# Patient Record
Sex: FEMALE | Race: WHITE | NOT HISPANIC OR LATINO | Employment: FULL TIME | URBAN - METROPOLITAN AREA
[De-identification: names, ages, dates, MRNs, and addresses within clinical notes are randomized per-mention and may not be internally consistent; named-entity substitution may affect disease eponyms.]

---

## 2017-01-03 ENCOUNTER — OFFICE VISIT (OUTPATIENT)
Dept: URGENT CARE | Facility: CLINIC | Age: 42
End: 2017-01-03
Payer: COMMERCIAL

## 2017-01-03 VITALS
WEIGHT: 150 LBS | OXYGEN SATURATION: 98 % | RESPIRATION RATE: 18 BRPM | HEIGHT: 69 IN | HEART RATE: 104 BPM | TEMPERATURE: 97.9 F | BODY MASS INDEX: 22.22 KG/M2 | DIASTOLIC BLOOD PRESSURE: 86 MMHG | SYSTOLIC BLOOD PRESSURE: 124 MMHG

## 2017-01-03 DIAGNOSIS — J01.10 ACUTE FRONTAL SINUSITIS, RECURRENCE NOT SPECIFIED: ICD-10-CM

## 2017-01-03 PROCEDURE — 99214 OFFICE O/P EST MOD 30 MIN: CPT | Performed by: PHYSICIAN ASSISTANT

## 2017-01-03 RX ORDER — AMOXICILLIN AND CLAVULANATE POTASSIUM 875; 125 MG/1; MG/1
1 TABLET, FILM COATED ORAL 2 TIMES DAILY
Qty: 20 TAB | Refills: 0 | Status: SHIPPED | OUTPATIENT
Start: 2017-01-03 | End: 2020-07-15

## 2017-01-03 ASSESSMENT — ENCOUNTER SYMPTOMS
NAUSEA: 0
ABDOMINAL PAIN: 0
SENSORY CHANGE: 0
SHORTNESS OF BREATH: 0
DIZZINESS: 0
WHEEZING: 0
HEADACHES: 1
DIARRHEA: 0
FEVER: 0
SINUS PRESSURE: 1
CHILLS: 0
SPUTUM PRODUCTION: 1
SORE THROAT: 0
COUGH: 1
MYALGIAS: 0
VOMITING: 0
PALPITATIONS: 0
TINGLING: 0
FOCAL WEAKNESS: 0

## 2017-01-03 NOTE — MR AVS SNAPSHOT
"        Lizzy De   1/3/2017 9:15 AM   Office Visit   MRN: [id number]    Department:  [facility]   Dept Phone:  [phone]    Description:  Female : 1975   Provider:  Torie Horn PA-C           Reason for Visit     Sinus Problem facial pressure, cough, yellowish green phlegm, since thursday       Allergies as of 1/3/2017     Allergen Noted Reactions    Iodine 2008         You were diagnosed with     Acute frontal sinusitis, recurrence not specified   [[id number]]         Vital Signs     Blood Pressure Pulse Temperature Respirations Height Weight    124/86 mmHg 104 36.6 °C (97.9 °F) 18 1.753 m (5' 9\") 68.04 kg (150 lb)    Body Mass Index Oxygen Saturation Breastfeeding?             22.14 kg/m2 98% No         Basic Information     Date Of Birth Sex Race Ethnicity Preferred Language    1975 Female White Non- English      Problem List              ICD-10-CM Priority Class Noted - Resolved    Migraine G43.909   2015 - Present      Health Maintenance        Date Due Completion Dates    IMM DTaP/Tdap/Td Vaccine (1 - Tdap) 1995 ---    PAP SMEAR 1997 ---    MAMMOGRAM 2016 ---    IMM INFLUENZA (1) 2016 ---            Current Immunizations     No immunizations on file.      Below and/or attached are the medications your provider expects you to take. Review all of your home medications and newly ordered medications with your provider and/or pharmacist. Follow medication instructions as directed by your provider and/or pharmacist. Please keep your medication list with you and share with your provider. Update the information when medications are discontinued, doses are changed, or new medications (including over-the-counter products) are added; and carry medication information at all times in the event of emergency situations     Allergies:  IODINE - (reactions not documented)               Medications  Valid as of: 2017 - 10:15 AM    Generic Name Brand " Name Tablet Size Instructions for use    Amoxicillin-Pot Clavulanate (Tab) AUGMENTIN 875-125 MG Take 1 Tab by mouth 2 times a day.        Aspirin-Acetaminophen-Caffeine (Tab) EXCEDRIN 250-250-65 MG Take 1 Tab by mouth every 6 hours as needed for Headache.        HydrOXYzine HCl (Tab) ATARAX 25 MG Take 1 Tab by mouth 3 times a day as needed for Itching.        MethylPREDNISolone (Tab) MEDROL DOSPACK 4 MG Take as directed.        Norgestim-Eth Estrad Triphasic   Take  by mouth. PT doesn't know exact strength        Triamcinolone Acetonide (Cream) KENALOG 0.1 % Apply to rash 2-3 times daily until resolved.        .                 Medicines prescribed today were sent to:     [facility] [address] [place] [address] [facility]    [address] [facility] [address]    Phone: [phone] Fax: [phone]    Open 24 Hours?: No      Medication refill instructions:       If your prescription bottle indicates you have medication refills left, it is not necessary to call your provider’s office. Please contact your pharmacy and they will refill your medication.    If your prescription bottle indicates you do not have any refills left, you may request refills at any time through one of the following ways: The online [employer] system (except Urgent Care), by calling your provider’s office, or by asking your pharmacy to contact your provider’s office with a refill request. Medication refills are processed only during regular business hours and may not be available until the next business day. Your provider may request additional information or to have a follow-up visit with you prior to refilling your medication.   *Please Note: Medication refills are assigned a new Rx number when refilled electronically. Your pharmacy may indicate that no refills were authorized even though a new prescription for the same medication is available at the pharmacy. Please request the medicine by  name with the pharmacy before contacting your provider for a refill.           [employer] Access Code: [email or web address]  Expires: 2/2/2017 10:15 AM    [employer]  A secure, online tool to manage your health information     [employer]’s [employer]® is a secure, online tool that connects you to your personalized health information from the privacy of your home -- day or night - making it very easy for you to manage your healthcare. Once the activation process is completed, you can even access your medical information using the [employer] jose, which is available for free in the Apple Jose store or Google Play store.     [employer] provides the following levels of access (as shown below):   My Chart Features   [facility] Primary [facility] Doctor [facility]  Specialists [facility]  Urgent  Care [facility]  Primary Care  Doctor   Email your healthcare team securely and privately 24/7 X X X    Manage appointments: schedule your next appointment; view details of past/upcoming appointments X      Request prescription refills. X      View recent personal medical records, including lab and immunizations X X X X   View health record, including health history, allergies, medications X X X X   Read reports about your outpatient visits, procedures, consult and ER notes X X X X   See your discharge summary, which is a recap of your hospital and/or ER visit that includes your diagnosis, lab results, and care plan. X X       How to register for [employer]:  1. Go to  [email or web address].  2. Click on the Sign Up Now box, which takes you to the New Member Sign Up page. You will need to provide the following information:  a. Enter your [employer] Access Code exactly as it appears at the top of this page. (You will not need to use this code after you’ve completed the sign-up process. If you do not sign up before the expiration date, you must request a new code.)   b. Enter your date of birth.   c. Enter your home email address.   d. Click Submit, and follow the  next screen’s instructions.  3. Create a [employer] ID. This will be your [employer] login ID and cannot be changed, so think of one that is secure and easy to remember.  4. Create a [employer] password. You can change your password at any time.  5. Enter your Password Reset Question and Answer. This can be used at a later time if you forget your password.   6. Enter your e-mail address. This allows you to receive e-mail notifications when new information is available in [employer].  7. Click Sign Up. You can now view your health information.    For assistance activating your [employer] account, call [phone]

## 2017-01-03 NOTE — PROGRESS NOTES
"Subjective:      Lizzy Kc is a 41 y.o. female who presents with Sinus Problem            Sinus Problem  This is a new problem. Episode onset: 5-6 days  The problem has been gradually worsening since onset. Maximum temperature: subjective  The pain is moderate. Associated symptoms include congestion (with yellow green sputum), coughing, headaches and sinus pressure (right sided). Pertinent negatives include no chills, ear pain, shortness of breath or sore throat. Treatments tried: fluids, Mucinex, OTC cold  The treatment provided mild relief.     No past medical history on file.  No past surgical history on file.    No family history on file.    Allergies   Allergen Reactions   • Iodine        Medications, Allergies, and current problem list reviewed today in Epic      Review of Systems   Constitutional: Positive for malaise/fatigue. Negative for fever and chills.   HENT: Positive for congestion (with yellow green sputum) and sinus pressure (right sided). Negative for ear discharge, ear pain and sore throat.         Sinus pressure and facial pain bilaterally    Respiratory: Positive for cough and sputum production. Negative for shortness of breath and wheezing.    Cardiovascular: Negative for chest pain, palpitations and leg swelling.   Gastrointestinal: Negative for nausea, vomiting, abdominal pain and diarrhea.   Musculoskeletal: Negative for myalgias.   Neurological: Positive for headaches. Negative for dizziness, tingling, sensory change and focal weakness.     All other systems reviewed and are negative.        Objective:     /86 mmHg  Pulse 104  Temp(Src) 36.6 °C (97.9 °F)  Resp 18  Ht 1.753 m (5' 9\")  Wt 68.04 kg (150 lb)  BMI 22.14 kg/m2  SpO2 98%  Breastfeeding? No     Physical Exam   Constitutional: She is oriented to person, place, and time. She appears well-developed and well-nourished. No distress.   HENT:   Head: Normocephalic and atraumatic.   Right Ear: Tympanic membrane, external " ear and ear canal normal.   Left Ear: Tympanic membrane, external ear and ear canal normal.   Nose: Mucosal edema and rhinorrhea present. Right sinus exhibits maxillary sinus tenderness. Right sinus exhibits no frontal sinus tenderness. Left sinus exhibits maxillary sinus tenderness. Left sinus exhibits no frontal sinus tenderness.   Mouth/Throat: Uvula is midline and oropharynx is clear and moist. No oropharyngeal exudate or posterior oropharyngeal erythema.   Neck: Neck supple.   Cardiovascular: Normal rate, regular rhythm and normal heart sounds.  Exam reveals no gallop and no friction rub.    No murmur heard.  Pulmonary/Chest: Effort normal and breath sounds normal. No respiratory distress. She has no decreased breath sounds. She has no wheezes. She has no rhonchi. She has no rales.   Lymphadenopathy:     She has no cervical adenopathy.   Neurological: She is alert and oriented to person, place, and time. No cranial nerve deficit.   Skin: Skin is warm and dry. No rash noted.   Psychiatric: She has a normal mood and affect. Her behavior is normal. Judgment and thought content normal.               Assessment/Plan:     1. Acute frontal sinusitis, recurrence not specified  amoxicillin-clavulanate (AUGMENTIN) 875-125 MG Tab         Current outpatient prescriptions:   •  amoxicillin-clavulanate (AUGMENTIN) 875-125 MG Tab, Take 1 Tab by mouth 2 times a day., Disp: 20 Tab, Rfl: 0    - encouraged fluids, rest, humidification, nasal decongestants, and FLonase.     Differential diagnoses, Supportive care, and indications for immediate follow-up discussed with patient.   Instructed to return to clinic or nearest emergency department for any change in condition, further concerns, or worsening of symptoms.    The patient demonstrated a good understanding and agreed with the treatment plan.    Torie Horn PA-C

## 2017-05-12 ENCOUNTER — RX ONLY (OUTPATIENT)
Age: 42
Setting detail: RX ONLY
End: 2017-05-12

## 2017-08-01 ENCOUNTER — HOSPITAL ENCOUNTER (OUTPATIENT)
Dept: LAB | Facility: MEDICAL CENTER | Age: 42
End: 2017-08-01
Attending: PHYSICIAN ASSISTANT
Payer: COMMERCIAL

## 2017-08-01 PROCEDURE — 87624 HPV HI-RISK TYP POOLED RSLT: CPT

## 2017-08-01 PROCEDURE — 88175 CYTOPATH C/V AUTO FLUID REDO: CPT

## 2017-08-03 LAB
CYTOLOGY REG CYTOL: NORMAL
HPV HR 12 DNA CVX QL NAA+PROBE: NEGATIVE
HPV16 DNA SPEC QL NAA+PROBE: NEGATIVE
HPV18 DNA SPEC QL NAA+PROBE: NEGATIVE
SPECIMEN SOURCE: NORMAL

## 2017-09-25 ENCOUNTER — OFFICE VISIT (OUTPATIENT)
Dept: URGENT CARE | Facility: CLINIC | Age: 42
End: 2017-09-25
Payer: COMMERCIAL

## 2017-09-25 VITALS
RESPIRATION RATE: 16 BRPM | HEIGHT: 69 IN | TEMPERATURE: 98.3 F | BODY MASS INDEX: 22.96 KG/M2 | HEART RATE: 74 BPM | SYSTOLIC BLOOD PRESSURE: 130 MMHG | WEIGHT: 155 LBS | OXYGEN SATURATION: 100 % | DIASTOLIC BLOOD PRESSURE: 84 MMHG

## 2017-09-25 DIAGNOSIS — L30.9 ECZEMA, UNSPECIFIED TYPE: ICD-10-CM

## 2017-09-25 PROCEDURE — 99214 OFFICE O/P EST MOD 30 MIN: CPT | Mod: 25 | Performed by: PHYSICIAN ASSISTANT

## 2017-09-25 PROCEDURE — 99999 PR NO CHARGE: CPT | Performed by: PHYSICIAN ASSISTANT

## 2017-09-25 RX ORDER — TRIAMCINOLONE ACETONIDE 40 MG/ML
40 INJECTION, SUSPENSION INTRA-ARTICULAR; INTRAMUSCULAR ONCE
Status: COMPLETED | OUTPATIENT
Start: 2017-09-25 | End: 2017-09-25

## 2017-09-25 RX ADMIN — TRIAMCINOLONE ACETONIDE 40 MG: 40 INJECTION, SUSPENSION INTRA-ARTICULAR; INTRAMUSCULAR at 17:37

## 2017-09-25 ASSESSMENT — ENCOUNTER SYMPTOMS
CHILLS: 0
SPUTUM PRODUCTION: 0
SHORTNESS OF BREATH: 0
TINGLING: 0
DIZZINESS: 0
FEVER: 0
ABDOMINAL PAIN: 0
DIARRHEA: 0
WHEEZING: 0
MYALGIAS: 0
FATIGUE: 0
COUGH: 0
SORE THROAT: 0
NAUSEA: 0
RHINORRHEA: 0
ANOREXIA: 0
HEADACHES: 0
PALPITATIONS: 0
VOMITING: 0

## 2017-09-26 NOTE — PROGRESS NOTES
"Subjective:      Lizzy Kc is a 42 y.o. female who presents with Eczema (break out, possible kenalog shot)            Rash   This is a recurrent problem. Episode onset: new flare about 1 week ago  The problem has been gradually worsening since onset. The rash is diffuse (arms, legs, face, ). The rash is characterized by redness and itchiness. She was exposed to nothing. Pertinent negatives include no anorexia, congestion, cough, diarrhea, facial edema, fatigue, fever, rhinorrhea, shortness of breath, sore throat or vomiting. Past treatments include antihistamine (steroid topical creams, oils, moisturizer). The treatment provided no relief. Her past medical history is significant for eczema.     Patient has history of recurrent eczema. Kenalog injections have helped in the past. She last received an injection about 6 months ago. She plans on establishing with a new PCP and seeing a dermatologist.    No past medical history on file.  No past surgical history on file.    No family history on file.    Allergies   Allergen Reactions   • Iodine        Medications, Allergies, and current problem list reviewed today in Epic      Review of Systems   Constitutional: Negative for chills, fatigue, fever and malaise/fatigue.   HENT: Negative for congestion, ear discharge, ear pain, rhinorrhea and sore throat.    Respiratory: Negative for cough, sputum production, shortness of breath and wheezing.    Cardiovascular: Negative for chest pain, palpitations and leg swelling.   Gastrointestinal: Negative for abdominal pain, anorexia, diarrhea, nausea and vomiting.   Musculoskeletal: Negative for myalgias.   Skin: Positive for itching and rash.   Neurological: Negative for dizziness, tingling and headaches.     All other systems reviewed and are negative.          Objective:     /84   Pulse 74   Temp 36.8 °C (98.3 °F)   Resp 16   Ht 1.753 m (5' 9\")   Wt 70.3 kg (155 lb)   SpO2 100%   BMI 22.89 kg/m²      Physical Exam "   Constitutional: She is oriented to person, place, and time. She appears well-developed and well-nourished. No distress.   HENT:   Head: Normocephalic and atraumatic.   Eyes: Conjunctivae are normal.   Cardiovascular: Normal rate, regular rhythm and normal heart sounds.    Pulmonary/Chest: Effort normal. No respiratory distress. She has no wheezes. She has no rales.   Neurological: She is alert and oriented to person, place, and time. No cranial nerve deficit.   Skin: Rash noted. Rash is maculopapular (noted on legs and arms bilaterally. Some patches on face c/w atopic dermatitis).   Psychiatric: She has a normal mood and affect. Her behavior is normal. Judgment and thought content normal.               Assessment/Plan:     1. Eczema, unspecified type    - triamcinolone acetonide (KENALOG-40) injection 40 mg; 1 mL by Intramuscular route Once.    Side effects and benefits of medication reviewed.      Differential diagnoses, Supportive care, and indications for immediate follow-up discussed with patient.   Instructed to return to clinic or nearest emergency department for any change in condition, further concerns, or worsening of symptoms.      The patient demonstrated a good understanding and agreed with the treatment plan.    Torie Horn P.A.-C.

## 2017-11-08 ENCOUNTER — HOSPITAL ENCOUNTER (OUTPATIENT)
Dept: RADIOLOGY | Facility: MEDICAL CENTER | Age: 42
End: 2017-11-08
Attending: PHYSICIAN ASSISTANT
Payer: COMMERCIAL

## 2017-11-08 DIAGNOSIS — N64.4 MASTODYNIA: ICD-10-CM

## 2017-11-08 PROCEDURE — 76642 ULTRASOUND BREAST LIMITED: CPT | Mod: RT

## 2017-11-08 PROCEDURE — G0279 TOMOSYNTHESIS, MAMMO: HCPCS

## 2017-12-29 ENCOUNTER — OFFICE VISIT (OUTPATIENT)
Dept: URGENT CARE | Facility: CLINIC | Age: 42
End: 2017-12-29
Payer: COMMERCIAL

## 2017-12-29 VITALS
DIASTOLIC BLOOD PRESSURE: 82 MMHG | HEIGHT: 69 IN | HEART RATE: 82 BPM | TEMPERATURE: 99.1 F | SYSTOLIC BLOOD PRESSURE: 120 MMHG | WEIGHT: 159.8 LBS | OXYGEN SATURATION: 96 % | BODY MASS INDEX: 23.67 KG/M2 | RESPIRATION RATE: 18 BRPM

## 2017-12-29 DIAGNOSIS — L30.8 OTHER ECZEMA: ICD-10-CM

## 2017-12-29 PROCEDURE — 99214 OFFICE O/P EST MOD 30 MIN: CPT | Performed by: NURSE PRACTITIONER

## 2017-12-29 RX ORDER — HYDROXYZINE HYDROCHLORIDE 25 MG/1
25 TABLET, FILM COATED ORAL 3 TIMES DAILY PRN
Qty: 30 TAB | Refills: 0 | Status: SHIPPED | OUTPATIENT
Start: 2017-12-29 | End: 2020-07-15

## 2017-12-29 RX ORDER — PREDNISONE 20 MG/1
TABLET ORAL
Qty: 22 TAB | Refills: 1 | Status: SHIPPED | OUTPATIENT
Start: 2017-12-29 | End: 2020-07-15

## 2017-12-29 ASSESSMENT — ENCOUNTER SYMPTOMS
CHILLS: 0
FEVER: 0

## 2017-12-29 NOTE — PROGRESS NOTES
"Subjective:      Lizzy Kc is a 42 y.o. female who presents with Eczema (x 1 week pt complains of pain, itchy)    PMH: history of eczema    Social hx: non-smoker    Family hx: not pertinent to today's visit    Allergies; Iodine            Other   This is a recurrent problem. The current episode started in the past 7 days. The problem has been unchanged. Associated symptoms include a rash. Pertinent negatives include no chills or fever. Nothing aggravates the symptoms. She has tried nothing for the symptoms. The treatment provided no relief.       Review of Systems   Constitutional: Negative for chills, fever and malaise/fatigue.   HENT: Negative.    Skin: Positive for itching and rash.   All other systems reviewed and are negative.         Objective:     /82   Pulse 82   Temp 37.3 °C (99.1 °F)   Resp 18   Ht 1.753 m (5' 9\")   Wt 72.5 kg (159 lb 12.8 oz)   SpO2 96%   BMI 23.60 kg/m²      Physical Exam   Neck: Normal range of motion. Neck supple.   Cardiovascular: Normal rate and regular rhythm.    Pulmonary/Chest: Effort normal and breath sounds normal.   Skin: Capillary refill takes less than 2 seconds. Rash noted.   There is a patchy scaly rash to the upper extremities, cheeks and forehead.   Psychiatric: She has a normal mood and affect. Her behavior is normal. Judgment and thought content normal.   Vitals reviewed.              Assessment/Plan:   Exacerbation of eczema    -prednisone taper   -atarax PRN itching   -keep follow up with PCP   -follow up ifs ymptoms persist or worsen   There are no diagnoses linked to this encounter.      "

## 2018-05-20 ENCOUNTER — OFFICE VISIT (OUTPATIENT)
Dept: URGENT CARE | Facility: CLINIC | Age: 43
End: 2018-05-20
Payer: COMMERCIAL

## 2018-05-20 VITALS
WEIGHT: 161 LBS | HEIGHT: 69 IN | TEMPERATURE: 98 F | SYSTOLIC BLOOD PRESSURE: 132 MMHG | OXYGEN SATURATION: 98 % | BODY MASS INDEX: 23.85 KG/M2 | DIASTOLIC BLOOD PRESSURE: 82 MMHG | HEART RATE: 71 BPM | RESPIRATION RATE: 14 BRPM

## 2018-05-20 DIAGNOSIS — L01.1 IMPETIGINIZED ATOPIC DERMATITIS: ICD-10-CM

## 2018-05-20 PROCEDURE — 99213 OFFICE O/P EST LOW 20 MIN: CPT | Performed by: EMERGENCY MEDICINE

## 2018-05-20 RX ORDER — MUPIROCIN CALCIUM 20 MG/G
1 CREAM TOPICAL 3 TIMES DAILY
Qty: 1 TUBE | Refills: 0 | Status: SHIPPED | OUTPATIENT
Start: 2018-05-20 | End: 2020-07-15

## 2018-05-20 ASSESSMENT — PATIENT HEALTH QUESTIONNAIRE - PHQ9: CLINICAL INTERPRETATION OF PHQ2 SCORE: 0

## 2018-05-20 ASSESSMENT — ENCOUNTER SYMPTOMS
SENSORY CHANGE: 0
TINGLING: 0
FEVER: 0
MYALGIAS: 0

## 2018-05-20 NOTE — PATIENT INSTRUCTIONS
You may use over-the-counter hydrocortisone as needed for skin symptom relief.  You should contact a primary care provider for follow-up as soon as available.  Referral provided.

## 2018-05-20 NOTE — PROGRESS NOTES
Subjective:      Lizzy Kc is a 43 y.o. female who presents with Rash (facial rash)            Rash   This is a new problem. Episode onset: over one week. The problem has been gradually worsening since onset. The affected locations include the face. The rash is characterized by redness and burning. It is unknown if there was an exposure to a precipitant. Pertinent negatives include no congestion or fever. Past treatments include moisturizer and topical steroids. The treatment provided no relief. Her past medical history is significant for eczema.       Review of Systems   Constitutional: Negative for fever.   HENT: Negative for congestion.    Musculoskeletal: Negative for myalgias.   Skin: Positive for rash.   Neurological: Negative for tingling and sensory change.   Endo/Heme/Allergies: Positive for environmental allergies.     PMH:  has no past medical history on file.  MEDS:   Current Outpatient Prescriptions:   •  mupirocin calcium (BACTROBAN) 2 % Cream, Apply 1 Application to affected area(s) 3 times a day., Disp: 1 Tube, Rfl: 0  •  triamcinolone acetonide (KENALOG) 0.1 % Cream, Apply to rash 2-3 times daily until resolved., Disp: 1 Tube, Rfl: 1  •  predniSONE (DELTASONE) 20 MG Tab, Take 1 tablet 3 times daily for 3 days, then take 1 tablet 2 times daily for 3 days, then take 1 tablet daily for 3 days, then take 1/2 tablet daily for 3 days., Disp: 22 Tab, Rfl: 1  •  hydrOXYzine HCl (ATARAX) 25 MG Tab, Take 1 Tab by mouth 3 times a day as needed for Itching., Disp: 30 Tab, Rfl: 0  •  amoxicillin-clavulanate (AUGMENTIN) 875-125 MG Tab, Take 1 Tab by mouth 2 times a day., Disp: 20 Tab, Rfl: 0  •  methylPREDNISolone (MEDROL DOSPACK) 4 MG Tab, Take as directed., Disp: 21 Tab, Rfl: 0  •  hydrOXYzine (ATARAX) 25 MG Tab, Take 1 Tab by mouth 3 times a day as needed for Itching., Disp: 30 Tab, Rfl: 1  •  Norgestim-Eth Estrad Triphasic (TRI-SPRINTEC PO), Take  by mouth. PT doesn't know exact strength, Disp: , Rfl:   •   "asa/apap/caffeine (EXCEDRIN) 250-250-65 MG Tab, Take 1 Tab by mouth every 6 hours as needed for Headache., Disp: , Rfl:   ALLERGIES:   Allergies   Allergen Reactions   • Iodine      SURGHX: History reviewed. No pertinent surgical history.  SOCHX:  reports that she has never smoked. She has never used smokeless tobacco. She reports that she drinks alcohol. She reports that she does not use drugs.  FH: family history is not on file.       Objective:     /82   Pulse 71   Temp 36.7 °C (98 °F)   Resp 14   Ht 1.753 m (5' 9\")   Wt 73 kg (161 lb)   SpO2 98%   BMI 23.78 kg/m²      Physical Exam   Constitutional: She is oriented to person, place, and time. Vital signs are normal. She appears well-developed and well-nourished. She is cooperative. She does not have a sickly appearance. No distress.   HENT:   Head: Normocephalic.   Nose: No mucosal edema or rhinorrhea.   Mouth/Throat: Oropharynx is clear and moist and mucous membranes are normal.   Eyes: Conjunctivae and lids are normal.   Lymphadenopathy:        Head (right side): No submandibular adenopathy present.        Head (left side): No submandibular adenopathy present.   Neurological: She is alert and oriented to person, place, and time.   No facial palsy.   Skin: Rash noted.        Psychiatric: She has a normal mood and affect.               Assessment/Plan:     1. Impetiginized atopic dermatitis  OTC hydrocortisone  - mupirocin calcium (BACTROBAN) 2 % Cream; Apply 1 Application to affected area(s) 3 times a day.  Dispense: 1 Tube; Refill: 0  - REFERRAL TO DERMATOLOGY      "

## 2018-05-28 ENCOUNTER — OFFICE VISIT (OUTPATIENT)
Dept: URGENT CARE | Facility: CLINIC | Age: 43
End: 2018-05-28
Payer: COMMERCIAL

## 2018-05-28 VITALS
HEIGHT: 69 IN | TEMPERATURE: 98 F | OXYGEN SATURATION: 98 % | SYSTOLIC BLOOD PRESSURE: 124 MMHG | BODY MASS INDEX: 23.85 KG/M2 | HEART RATE: 69 BPM | RESPIRATION RATE: 14 BRPM | WEIGHT: 161 LBS | DIASTOLIC BLOOD PRESSURE: 90 MMHG

## 2018-05-28 DIAGNOSIS — L01.1 IMPETIGINIZED ATOPIC DERMATITIS: ICD-10-CM

## 2018-05-28 PROCEDURE — 99214 OFFICE O/P EST MOD 30 MIN: CPT | Performed by: PHYSICIAN ASSISTANT

## 2018-05-28 RX ORDER — CEPHALEXIN 500 MG/1
500 CAPSULE ORAL 3 TIMES DAILY
Qty: 21 CAP | Refills: 0 | Status: SHIPPED | OUTPATIENT
Start: 2018-05-28 | End: 2018-06-04

## 2018-05-28 RX ORDER — METHYLPREDNISOLONE 4 MG/1
TABLET ORAL
Qty: 21 TAB | Refills: 0 | Status: SHIPPED | OUTPATIENT
Start: 2018-05-28 | End: 2020-07-15

## 2018-05-28 ASSESSMENT — ENCOUNTER SYMPTOMS
FATIGUE: 0
CHILLS: 0
MYALGIAS: 0
ABDOMINAL PAIN: 0
COUGH: 0
SHORTNESS OF BREATH: 0
SORE THROAT: 0
NAUSEA: 0
WHEEZING: 0
HEADACHES: 0
FEVER: 0
VOMITING: 0

## 2018-05-28 NOTE — PROGRESS NOTES
"Subjective:      Lizzy Kc is a 43 y.o. female who presents with Rash            Rash   This is a new problem. Episode onset: > 1 week  The problem has been gradually worsening since onset. Location: facial rash-  The rash is characterized by itchiness, dryness and redness. It is unknown if there was an exposure to a precipitant. Pertinent negatives include no congestion, cough, facial edema, fatigue, fever, joint pain, shortness of breath, sore throat or vomiting. Treatments tried: batroban ointment  The treatment provided no relief. Her past medical history is significant for eczema.       History reviewed. No pertinent past medical history.    History reviewed. No pertinent surgical history.    History reviewed. No pertinent family history.    Allergies   Allergen Reactions   • Iodine        Medications, Allergies, and current problem list reviewed today in Epic    Review of Systems   Constitutional: Negative for chills, fatigue, fever and malaise/fatigue.   HENT: Negative for congestion, ear discharge, ear pain and sore throat.    Respiratory: Negative for cough, shortness of breath and wheezing.    Cardiovascular: Negative for chest pain and leg swelling.   Gastrointestinal: Negative for abdominal pain, nausea and vomiting.   Musculoskeletal: Negative for joint pain and myalgias.   Skin: Positive for itching and rash.   Neurological: Negative for headaches.     All other systems reviewed and are negative.        Objective:     /90   Pulse 69   Temp 36.7 °C (98 °F)   Resp 14   Ht 1.753 m (5' 9\")   Wt 73 kg (161 lb)   SpO2 98%   BMI 23.78 kg/m²      Physical Exam   Constitutional: She is oriented to person, place, and time. She appears well-developed and well-nourished. No distress.   HENT:   Head: Normocephalic and atraumatic.   Pulmonary/Chest: Effort normal. No respiratory distress.   Neurological: She is alert and oriented to person, place, and time. No cranial nerve deficit.   Skin: Rash " noted. Rash is pustular.        Psychiatric: She has a normal mood and affect. Her behavior is normal. Judgment and thought content normal.               Assessment/Plan:     1. Impetiginized atopic dermatitis      - cephALEXin (KEFLEX) 500 MG Cap; Take 1 Cap by mouth 3 times a day for 7 days.  Dispense: 21 Cap; Refill: 0  - MethylPREDNISolone (MEDROL DOSEPAK) 4 MG Tablet Therapy Pack; Taken as directed on package. 1 pack.  Dispense: 21 Tab; Refill: 0       Differential diagnoses, Supportive care, and indications for immediate follow-up discussed with patient.   Instructed to return to clinic or nearest emergency department for any change in condition, further concerns, or worsening of symptoms.    The patient demonstrated a good understanding and agreed with the treatment plan.    Torie Horn P.A.-C.

## 2018-06-14 ENCOUNTER — APPOINTMENT (RX ONLY)
Dept: URBAN - METROPOLITAN AREA CLINIC 20 | Facility: CLINIC | Age: 43
Setting detail: DERMATOLOGY
End: 2018-06-14

## 2018-06-14 DIAGNOSIS — L71.0 PERIORAL DERMATITIS: ICD-10-CM

## 2018-06-14 DIAGNOSIS — L259 CONTACT DERMATITIS AND OTHER ECZEMA, UNSPECIFIED CAUSE: ICD-10-CM

## 2018-06-14 PROBLEM — L30.8 OTHER SPECIFIED DERMATITIS: Status: ACTIVE | Noted: 2018-06-14

## 2018-06-14 PROBLEM — L20.84 INTRINSIC (ALLERGIC) ECZEMA: Status: ACTIVE | Noted: 2018-06-14

## 2018-06-14 PROCEDURE — 99214 OFFICE O/P EST MOD 30 MIN: CPT

## 2018-06-14 PROCEDURE — ? ADDITIONAL NOTES

## 2018-06-14 PROCEDURE — ? COUNSELING

## 2018-06-14 PROCEDURE — ? PRESCRIPTION

## 2018-06-14 RX ORDER — PIMECROLIMUS 10 MG/G
CREAM TOPICAL BID
Qty: 1 | Refills: 3 | Status: ERX | COMMUNITY
Start: 2018-06-14

## 2018-06-14 RX ORDER — CLOBETASOL PROPIONATE 0.5 MG/G
CREAM TOPICAL BID
Qty: 1 | Refills: 3 | Status: ERX | COMMUNITY
Start: 2018-06-14

## 2018-06-14 RX ORDER — DOXYCYCLINE 100 MG/1
TABLET, FILM COATED ORAL
Qty: 60 | Refills: 3 | Status: ERX | COMMUNITY
Start: 2018-06-14

## 2018-06-14 RX ADMIN — DOXYCYCLINE: 100 TABLET, FILM COATED ORAL at 17:02

## 2018-06-14 RX ADMIN — CLOBETASOL PROPIONATE: 0.5 CREAM TOPICAL at 17:09

## 2018-06-14 RX ADMIN — PIMECROLIMUS: 10 CREAM TOPICAL at 17:02

## 2018-06-14 ASSESSMENT — LOCATION DETAILED DESCRIPTION DERM
LOCATION DETAILED: LEFT INFERIOR MEDIAL MALAR CHEEK
LOCATION DETAILED: RIGHT ULNAR DORSAL HAND
LOCATION DETAILED: LEFT SUPERIOR MEDIAL MALAR CHEEK
LOCATION DETAILED: RIGHT LOWER CUTANEOUS LIP
LOCATION DETAILED: LEFT SUPERIOR CENTRAL MALAR CHEEK
LOCATION DETAILED: RIGHT INFERIOR MEDIAL MALAR CHEEK
LOCATION DETAILED: RIGHT SUPERIOR CENTRAL MALAR CHEEK
LOCATION DETAILED: RIGHT PROXIMAL DORSAL FOREARM
LOCATION DETAILED: LEFT LOWER CUTANEOUS LIP
LOCATION DETAILED: LEFT ULNAR DORSAL HAND
LOCATION DETAILED: RIGHT SUPERIOR MEDIAL MALAR CHEEK
LOCATION DETAILED: LEFT PROXIMAL DORSAL FOREARM

## 2018-06-14 ASSESSMENT — LOCATION SIMPLE DESCRIPTION DERM
LOCATION SIMPLE: RIGHT LIP
LOCATION SIMPLE: RIGHT FOREARM
LOCATION SIMPLE: LEFT CHEEK
LOCATION SIMPLE: LEFT LIP
LOCATION SIMPLE: RIGHT HAND
LOCATION SIMPLE: RIGHT CHEEK
LOCATION SIMPLE: LEFT FOREARM
LOCATION SIMPLE: LEFT HAND

## 2018-06-14 ASSESSMENT — LOCATION ZONE DERM
LOCATION ZONE: LIP
LOCATION ZONE: HAND
LOCATION ZONE: ARM
LOCATION ZONE: FACE

## 2018-06-14 NOTE — HPI: RASH
How Severe Is Your Rash?: moderate
Is This A New Presentation, Or A Follow-Up?: Rash
How Severe Is Your Rash?: mild
Additional History: Pt has hx of eczema, used TAC with minimal relief over the years.

## 2018-08-27 ENCOUNTER — HOSPITAL ENCOUNTER (OUTPATIENT)
Dept: LAB | Facility: MEDICAL CENTER | Age: 43
End: 2018-08-27
Attending: PHYSICIAN ASSISTANT
Payer: COMMERCIAL

## 2018-08-27 PROCEDURE — 87624 HPV HI-RISK TYP POOLED RSLT: CPT

## 2018-08-27 PROCEDURE — 88175 CYTOPATH C/V AUTO FLUID REDO: CPT

## 2018-12-07 ENCOUNTER — OFFICE VISIT (OUTPATIENT)
Dept: URGENT CARE | Facility: CLINIC | Age: 43
End: 2018-12-07
Payer: COMMERCIAL

## 2018-12-07 VITALS
TEMPERATURE: 98.4 F | RESPIRATION RATE: 16 BRPM | HEART RATE: 86 BPM | DIASTOLIC BLOOD PRESSURE: 58 MMHG | HEIGHT: 69 IN | WEIGHT: 161 LBS | BODY MASS INDEX: 23.85 KG/M2 | OXYGEN SATURATION: 99 % | SYSTOLIC BLOOD PRESSURE: 116 MMHG

## 2018-12-07 DIAGNOSIS — L30.9 ECZEMA, UNSPECIFIED TYPE: ICD-10-CM

## 2018-12-07 PROCEDURE — 99213 OFFICE O/P EST LOW 20 MIN: CPT | Performed by: EMERGENCY MEDICINE

## 2018-12-07 RX ORDER — PREDNISONE 20 MG/1
TABLET ORAL
Qty: 18 TAB | Refills: 0 | Status: SHIPPED | OUTPATIENT
Start: 2018-12-07 | End: 2020-07-15

## 2018-12-07 RX ORDER — NORGESTIMATE-ETHINYL ESTRADIOL 7DAYSX3 28
TABLET ORAL
Refills: 4 | COMMUNITY
Start: 2018-11-20 | End: 2020-07-15

## 2018-12-07 ASSESSMENT — ENCOUNTER SYMPTOMS
FEVER: 0
GASTROINTESTINAL NEGATIVE: 1
RESPIRATORY NEGATIVE: 1

## 2018-12-08 NOTE — PROGRESS NOTES
Subjective:      Lizzy Kc is a 43 y.o. female who presents with Eczema (hx of eczema, flare up x10 days)            Rash   This is a recurrent problem. Episode onset: 2 weeks. The affected locations include the face, left arm, left lower leg, right arm and right lower leg. The rash is characterized by redness, itchiness and burning. She was exposed to nothing. Associated symptoms include congestion. Pertinent negatives include no fever. Past treatments include topical steroids. The treatment provided no relief. Her past medical history is significant for eczema.   Patient notes multiple similar episodes in the past; has tried various topical remedies without success.  Has had follow-up with dermatology.  Notes during exacerbations can get temporary relief with oral steroids.  Has used antihistamines with minimal improvement    Review of Systems   Constitutional: Negative for fever.   HENT: Positive for congestion.    Respiratory: Negative.    Gastrointestinal: Negative.    Skin: Positive for rash.     PMH:  has no past medical history on file.  MEDS:   Current Outpatient Prescriptions:   •  TRINESSA, 28, 0.18/0.215/0.25 MG-35 MCG Tab, TK 1 T PO D, Disp: , Rfl: 4  •  predniSONE (DELTASONE) 20 MG Tab, Take 3 tablets once a day for 3 days, then 2 tablets once a day for 3 days, then 1 tablet once a day for 3 days, Disp: 18 Tab, Rfl: 0  •  MethylPREDNISolone (MEDROL DOSEPAK) 4 MG Tablet Therapy Pack, Taken as directed on package. 1 pack., Disp: 21 Tab, Rfl: 0  •  mupirocin calcium (BACTROBAN) 2 % Cream, Apply 1 Application to affected area(s) 3 times a day., Disp: 1 Tube, Rfl: 0  •  predniSONE (DELTASONE) 20 MG Tab, Take 1 tablet 3 times daily for 3 days, then take 1 tablet 2 times daily for 3 days, then take 1 tablet daily for 3 days, then take 1/2 tablet daily for 3 days., Disp: 22 Tab, Rfl: 1  •  hydrOXYzine HCl (ATARAX) 25 MG Tab, Take 1 Tab by mouth 3 times a day as needed for Itching. (Patient not taking:  "Reported on 12/7/2018), Disp: 30 Tab, Rfl: 0  •  amoxicillin-clavulanate (AUGMENTIN) 875-125 MG Tab, Take 1 Tab by mouth 2 times a day., Disp: 20 Tab, Rfl: 0  •  methylPREDNISolone (MEDROL DOSPACK) 4 MG Tab, Take as directed., Disp: 21 Tab, Rfl: 0  •  hydrOXYzine (ATARAX) 25 MG Tab, Take 1 Tab by mouth 3 times a day as needed for Itching., Disp: 30 Tab, Rfl: 1  •  triamcinolone acetonide (KENALOG) 0.1 % Cream, Apply to rash 2-3 times daily until resolved., Disp: 1 Tube, Rfl: 1  •  Norgestim-Eth Estrad Triphasic (TRI-SPRINTEC PO), Take  by mouth. PT doesn't know exact strength, Disp: , Rfl:   •  asa/apap/caffeine (EXCEDRIN) 250-250-65 MG Tab, Take 1 Tab by mouth every 6 hours as needed for Headache., Disp: , Rfl:   ALLERGIES:   Allergies   Allergen Reactions   • Iodine      SURGHX: History reviewed. No pertinent surgical history.  SOCHX:  reports that she has never smoked. She has never used smokeless tobacco. She reports that she drinks alcohol. She reports that she does not use drugs.  FH: family history is not on file.         Objective:     /58   Pulse 86   Temp 36.9 °C (98.4 °F) (Temporal)   Resp 16   Ht 1.753 m (5' 9\")   Wt 73 kg (161 lb)   SpO2 99%   BMI 23.78 kg/m²      Physical Exam   Constitutional: Vital signs are normal. She appears well-developed and well-nourished. She is easily aroused. She does not have a sickly appearance. She does not appear ill.   HENT:   Nose: No rhinorrhea.   Mouth/Throat: Mucous membranes are normal.   Eyes: Conjunctivae are normal. Left eye exhibits no chemosis and no discharge.   Neck: Phonation normal.   Pulmonary/Chest: Effort normal.   Neurological: She is alert and easily aroused.   Skin: Skin is warm and dry. Rash noted.   Erythematous maculopapular and patchy eruptions forehead, left medial orbital, mid facial, chin, bilateral dorsal arms, anterior lower legs.  No cellulitis or impetigo noted.   Psychiatric: She has a normal mood and affect.             "   Assessment/Plan:     1. Eczema, unspecified type  OTC antihistamine prn  Own topical steroid to non-facial areas.  - predniSONE (DELTASONE) 20 MG Tab; Take 3 tablets once a day for 3 days, then 2 tablets once a day for 3 days, then 1 tablet once a day for 3 days  Dispense: 18 Tab; Refill: 0  - REFERRAL TO DERMATOLOGY

## 2019-04-04 ENCOUNTER — OFFICE VISIT (OUTPATIENT)
Dept: URGENT CARE | Facility: CLINIC | Age: 44
End: 2019-04-04
Payer: COMMERCIAL

## 2019-04-04 VITALS
BODY MASS INDEX: 23.85 KG/M2 | TEMPERATURE: 97.5 F | DIASTOLIC BLOOD PRESSURE: 80 MMHG | OXYGEN SATURATION: 95 % | WEIGHT: 161 LBS | HEIGHT: 69 IN | HEART RATE: 70 BPM | SYSTOLIC BLOOD PRESSURE: 106 MMHG

## 2019-04-04 DIAGNOSIS — L30.9 FACIAL DERMATITIS: ICD-10-CM

## 2019-04-04 PROCEDURE — 99213 OFFICE O/P EST LOW 20 MIN: CPT | Performed by: NURSE PRACTITIONER

## 2019-04-04 RX ORDER — PREDNISONE 10 MG/1
TABLET ORAL
Qty: 15 TAB | Refills: 0 | Status: SHIPPED | OUTPATIENT
Start: 2019-04-04 | End: 2020-07-15

## 2019-04-04 RX ORDER — CEPHALEXIN 500 MG/1
500 CAPSULE ORAL 3 TIMES DAILY
Qty: 21 CAP | Refills: 0 | Status: SHIPPED | OUTPATIENT
Start: 2019-04-04 | End: 2019-04-11

## 2019-04-04 NOTE — PROGRESS NOTES
"Subjective:      Lizzy Kc is a 44 y.o. female who presents with Rash (Left side of face x 4 days)    History reviewed. No pertinent past medical history.  Social History     Social History   • Marital status: Single     Spouse name: N/A   • Number of children: N/A   • Years of education: N/A     Occupational History   • Not on file.     Social History Main Topics   • Smoking status: Never Smoker   • Smokeless tobacco: Never Used   • Alcohol use Yes      Comment: occasional   • Drug use: No   • Sexual activity: Not on file     Other Topics Concern   • Not on file     Social History Narrative   • No narrative on file     History reviewed. No pertinent family history.    Allergies: Iodine    C/o rash and itching to the face, positive hx of facial dermatitis.  Rash involves the eyelids.  She is seen a dermatologist for this previously and has been told that she should not put topical steroids on her eyes.  She is requesting other treatment she cannot get into see her dermatologist until May.          Other   This is a recurrent problem. The current episode started in the past 7 days. The problem occurs constantly. The problem has been unchanged. Associated symptoms include a rash. Nothing aggravates the symptoms. She has tried nothing for the symptoms. The treatment provided no relief.       Review of Systems   Skin: Positive for itching and rash.          Objective:     /80   Pulse 70   Temp 36.4 °C (97.5 °F) (Temporal)   Ht 1.753 m (5' 9\")   Wt 73 kg (161 lb)   SpO2 95%   BMI 23.78 kg/m²      Physical Exam   Constitutional: She is oriented to person, place, and time. She appears well-developed and well-nourished. No distress.   HENT:   Head:       Neurological: She is alert and oriented to person, place, and time.   Skin: Skin is warm and dry. Rash noted. She is not diaphoretic.   Psychiatric: She has a normal mood and affect. Her behavior is normal.   Vitals reviewed.              Assessment/Plan: "     1. Facial dermatitis  - predniSONE (DELTASONE) 10 MG Tab; Take 1 tablet 3 times daily for 5 days  Dispense: 15 Tab; Refill: 0  - cephALEXin (KEFLEX) 500 MG Cap; Take 1 Cap by mouth 3 times a day for 7 days.  Dispense: 21 Cap; Refill: 0  -follow up for persistent sx

## 2019-08-29 ENCOUNTER — HOSPITAL ENCOUNTER (OUTPATIENT)
Dept: LAB | Facility: MEDICAL CENTER | Age: 44
End: 2019-08-29
Attending: PHYSICIAN ASSISTANT
Payer: COMMERCIAL

## 2019-08-29 LAB — CYTOLOGY REG CYTOL: NORMAL

## 2019-08-29 PROCEDURE — 88175 CYTOPATH C/V AUTO FLUID REDO: CPT

## 2019-08-29 PROCEDURE — 87624 HPV HI-RISK TYP POOLED RSLT: CPT

## 2019-09-05 LAB
HPV HR 12 DNA CVX QL NAA+PROBE: NEGATIVE
HPV16 DNA SPEC QL NAA+PROBE: NEGATIVE
HPV18 DNA SPEC QL NAA+PROBE: NEGATIVE
SPECIMEN SOURCE: NORMAL

## 2020-07-15 ENCOUNTER — ANESTHESIA (OUTPATIENT)
Dept: SURGERY | Facility: MEDICAL CENTER | Age: 45
End: 2020-07-15
Payer: COMMERCIAL

## 2020-07-15 ENCOUNTER — ANESTHESIA EVENT (OUTPATIENT)
Dept: SURGERY | Facility: MEDICAL CENTER | Age: 45
End: 2020-07-15
Payer: COMMERCIAL

## 2020-07-15 ENCOUNTER — APPOINTMENT (OUTPATIENT)
Dept: RADIOLOGY | Facility: MEDICAL CENTER | Age: 45
End: 2020-07-15
Attending: EMERGENCY MEDICINE
Payer: COMMERCIAL

## 2020-07-15 ENCOUNTER — HOSPITAL ENCOUNTER (EMERGENCY)
Facility: MEDICAL CENTER | Age: 45
End: 2020-07-15
Attending: EMERGENCY MEDICINE
Payer: COMMERCIAL

## 2020-07-15 VITALS
HEIGHT: 68 IN | RESPIRATION RATE: 21 BRPM | BODY MASS INDEX: 23.82 KG/M2 | WEIGHT: 157.19 LBS | HEART RATE: 76 BPM | DIASTOLIC BLOOD PRESSURE: 78 MMHG | SYSTOLIC BLOOD PRESSURE: 133 MMHG | OXYGEN SATURATION: 98 % | TEMPERATURE: 97.9 F

## 2020-07-15 DIAGNOSIS — K35.30 ACUTE APPENDICITIS WITH LOCALIZED PERITONITIS, WITHOUT PERFORATION, ABSCESS, OR GANGRENE: ICD-10-CM

## 2020-07-15 DIAGNOSIS — G89.18 POST-OP PAIN: ICD-10-CM

## 2020-07-15 LAB
ALBUMIN SERPL BCP-MCNC: 4.6 G/DL (ref 3.2–4.9)
ALBUMIN/GLOB SERPL: 1.4 G/DL
ALP SERPL-CCNC: 82 U/L (ref 30–99)
ALT SERPL-CCNC: 14 U/L (ref 2–50)
ANION GAP SERPL CALC-SCNC: 11 MMOL/L (ref 7–16)
APPEARANCE UR: CLEAR
AST SERPL-CCNC: 19 U/L (ref 12–45)
BACTERIA #/AREA URNS HPF: ABNORMAL /HPF
BASOPHILS # BLD AUTO: 0.8 % (ref 0–1.8)
BASOPHILS # BLD: 0.08 K/UL (ref 0–0.12)
BILIRUB SERPL-MCNC: 0.7 MG/DL (ref 0.1–1.5)
BILIRUB UR QL STRIP.AUTO: NEGATIVE
BUN SERPL-MCNC: 8 MG/DL (ref 8–22)
CALCIUM SERPL-MCNC: 9.5 MG/DL (ref 8.4–10.2)
CHLORIDE SERPL-SCNC: 97 MMOL/L (ref 96–112)
CO2 SERPL-SCNC: 27 MMOL/L (ref 20–33)
COLOR UR: YELLOW
COVID ORDER STATUS COVID19: NORMAL
CREAT SERPL-MCNC: 0.69 MG/DL (ref 0.5–1.4)
EOSINOPHIL # BLD AUTO: 0.05 K/UL (ref 0–0.51)
EOSINOPHIL NFR BLD: 0.5 % (ref 0–6.9)
EPI CELLS #/AREA URNS HPF: ABNORMAL /HPF
ERYTHROCYTE [DISTWIDTH] IN BLOOD BY AUTOMATED COUNT: 41.1 FL (ref 35.9–50)
GLOBULIN SER CALC-MCNC: 3.4 G/DL (ref 1.9–3.5)
GLUCOSE SERPL-MCNC: 107 MG/DL (ref 65–99)
GLUCOSE UR STRIP.AUTO-MCNC: NEGATIVE MG/DL
HCG SERPL QL: NEGATIVE
HCT VFR BLD AUTO: 39.3 % (ref 37–47)
HGB BLD-MCNC: 13.3 G/DL (ref 12–16)
IMM GRANULOCYTES # BLD AUTO: 0.04 K/UL (ref 0–0.11)
IMM GRANULOCYTES NFR BLD AUTO: 0.4 % (ref 0–0.9)
KETONES UR STRIP.AUTO-MCNC: NEGATIVE MG/DL
LEUKOCYTE ESTERASE UR QL STRIP.AUTO: NEGATIVE
LIPASE SERPL-CCNC: 17 U/L (ref 7–58)
LYMPHOCYTES # BLD AUTO: 2.68 K/UL (ref 1–4.8)
LYMPHOCYTES NFR BLD: 25.9 % (ref 22–41)
MCH RBC QN AUTO: 29.4 PG (ref 27–33)
MCHC RBC AUTO-ENTMCNC: 33.8 G/DL (ref 33.6–35)
MCV RBC AUTO: 86.9 FL (ref 81.4–97.8)
MICRO URNS: ABNORMAL
MONOCYTES # BLD AUTO: 0.73 K/UL (ref 0–0.85)
MONOCYTES NFR BLD AUTO: 7 % (ref 0–13.4)
NEUTROPHILS # BLD AUTO: 6.78 K/UL (ref 2–7.15)
NEUTROPHILS NFR BLD: 65.4 % (ref 44–72)
NITRITE UR QL STRIP.AUTO: NEGATIVE
NRBC # BLD AUTO: 0 K/UL
NRBC BLD-RTO: 0 /100 WBC
PATHOLOGY CONSULT NOTE: NORMAL
PH UR STRIP.AUTO: 6 [PH] (ref 5–8)
PLATELET # BLD AUTO: 218 K/UL (ref 164–446)
PMV BLD AUTO: 10.6 FL (ref 9–12.9)
POTASSIUM SERPL-SCNC: 3.3 MMOL/L (ref 3.6–5.5)
PROT SERPL-MCNC: 8 G/DL (ref 6–8.2)
PROT UR QL STRIP: NEGATIVE MG/DL
RBC # BLD AUTO: 4.52 M/UL (ref 4.2–5.4)
RBC # URNS HPF: ABNORMAL /HPF
RBC UR QL AUTO: ABNORMAL
SARS-COV-2 RDRP RESP QL NAA+PROBE: NOTDETECTED
SODIUM SERPL-SCNC: 135 MMOL/L (ref 135–145)
SP GR UR STRIP.AUTO: 1.01
SPECIMEN SOURCE: NORMAL
WBC # BLD AUTO: 10.4 K/UL (ref 4.8–10.8)
WBC #/AREA URNS HPF: ABNORMAL /HPF

## 2020-07-15 PROCEDURE — 96368 THER/DIAG CONCURRENT INF: CPT

## 2020-07-15 PROCEDURE — U0004 COV-19 TEST NON-CDC HGH THRU: HCPCS

## 2020-07-15 PROCEDURE — 74177 CT ABD & PELVIS W/CONTRAST: CPT

## 2020-07-15 PROCEDURE — 501399 HCHG SPECIMAN BAG, ENDO CATC: Performed by: SURGERY

## 2020-07-15 PROCEDURE — 700111 HCHG RX REV CODE 636 W/ 250 OVERRIDE (IP): Performed by: EMERGENCY MEDICINE

## 2020-07-15 PROCEDURE — 110454 HCHG SHELL REV 250: Performed by: SURGERY

## 2020-07-15 PROCEDURE — 85025 COMPLETE CBC W/AUTO DIFF WBC: CPT

## 2020-07-15 PROCEDURE — 501450 HCHG STAPLES, ENDO MULTIFIRE: Performed by: SURGERY

## 2020-07-15 PROCEDURE — 160048 HCHG OR STATISTICAL LEVEL 1-5: Performed by: SURGERY

## 2020-07-15 PROCEDURE — 160035 HCHG PACU - 1ST 60 MINS PHASE I: Performed by: SURGERY

## 2020-07-15 PROCEDURE — 88304 TISSUE EXAM BY PATHOLOGIST: CPT

## 2020-07-15 PROCEDURE — 99291 CRITICAL CARE FIRST HOUR: CPT

## 2020-07-15 PROCEDURE — 501571 HCHG TROCAR, SEPARATOR 12X100: Performed by: SURGERY

## 2020-07-15 PROCEDURE — 501838 HCHG SUTURE GENERAL: Performed by: SURGERY

## 2020-07-15 PROCEDURE — 700101 HCHG RX REV CODE 250: Performed by: EMERGENCY MEDICINE

## 2020-07-15 PROCEDURE — 700101 HCHG RX REV CODE 250: Performed by: ANESTHESIOLOGY

## 2020-07-15 PROCEDURE — 80053 COMPREHEN METABOLIC PANEL: CPT

## 2020-07-15 PROCEDURE — 160002 HCHG RECOVERY MINUTES (STAT): Performed by: SURGERY

## 2020-07-15 PROCEDURE — 700105 HCHG RX REV CODE 258: Performed by: SURGERY

## 2020-07-15 PROCEDURE — 700102 HCHG RX REV CODE 250 W/ 637 OVERRIDE(OP): Performed by: ANESTHESIOLOGY

## 2020-07-15 PROCEDURE — 700111 HCHG RX REV CODE 636 W/ 250 OVERRIDE (IP): Performed by: ANESTHESIOLOGY

## 2020-07-15 PROCEDURE — 500002 HCHG ADHESIVE, DERMABOND: Performed by: SURGERY

## 2020-07-15 PROCEDURE — 96365 THER/PROPH/DIAG IV INF INIT: CPT

## 2020-07-15 PROCEDURE — 160009 HCHG ANES TIME/MIN: Performed by: SURGERY

## 2020-07-15 PROCEDURE — C9803 HOPD COVID-19 SPEC COLLECT: HCPCS | Performed by: EMERGENCY MEDICINE

## 2020-07-15 PROCEDURE — 700105 HCHG RX REV CODE 258: Performed by: EMERGENCY MEDICINE

## 2020-07-15 PROCEDURE — 500800 HCHG LAPAROSCOPIC J/L HOOK: Performed by: SURGERY

## 2020-07-15 PROCEDURE — 502571 HCHG PACK, LAP CHOLE: Performed by: SURGERY

## 2020-07-15 PROCEDURE — 36415 COLL VENOUS BLD VENIPUNCTURE: CPT

## 2020-07-15 PROCEDURE — 160046 HCHG PACU - 1ST 60 MINS PHASE II: Performed by: SURGERY

## 2020-07-15 PROCEDURE — 160041 HCHG SURGERY MINUTES - EA ADDL 1 MIN LEVEL 4: Performed by: SURGERY

## 2020-07-15 PROCEDURE — 700117 HCHG RX CONTRAST REV CODE 255: Performed by: EMERGENCY MEDICINE

## 2020-07-15 PROCEDURE — 700101 HCHG RX REV CODE 250: Performed by: SURGERY

## 2020-07-15 PROCEDURE — A9270 NON-COVERED ITEM OR SERVICE: HCPCS | Performed by: ANESTHESIOLOGY

## 2020-07-15 PROCEDURE — 501570 HCHG TROCAR, SEPARATOR: Performed by: SURGERY

## 2020-07-15 PROCEDURE — 160029 HCHG SURGERY MINUTES - 1ST 30 MINS LEVEL 4: Performed by: SURGERY

## 2020-07-15 PROCEDURE — 81001 URINALYSIS AUTO W/SCOPE: CPT

## 2020-07-15 PROCEDURE — 83690 ASSAY OF LIPASE: CPT

## 2020-07-15 PROCEDURE — 501583 HCHG TROCAR, THRD CAN&SEAL 5X100: Performed by: SURGERY

## 2020-07-15 PROCEDURE — 96375 TX/PRO/DX INJ NEW DRUG ADDON: CPT

## 2020-07-15 PROCEDURE — 84703 CHORIONIC GONADOTROPIN ASSAY: CPT

## 2020-07-15 PROCEDURE — 502648 HCHG APPLICATOR, EVICEL: Performed by: SURGERY

## 2020-07-15 PROCEDURE — 160025 RECOVERY II MINUTES (STATS): Performed by: SURGERY

## 2020-07-15 RX ORDER — OXYCODONE HCL 5 MG/5 ML
5 SOLUTION, ORAL ORAL
Status: COMPLETED | OUTPATIENT
Start: 2020-07-15 | End: 2020-07-15

## 2020-07-15 RX ORDER — OXYCODONE HCL 5 MG/5 ML
10 SOLUTION, ORAL ORAL
Status: COMPLETED | OUTPATIENT
Start: 2020-07-15 | End: 2020-07-15

## 2020-07-15 RX ORDER — LIDOCAINE HYDROCHLORIDE 20 MG/ML
INJECTION, SOLUTION EPIDURAL; INFILTRATION; INTRACAUDAL; PERINEURAL PRN
Status: DISCONTINUED | OUTPATIENT
Start: 2020-07-15 | End: 2020-07-15 | Stop reason: SURG

## 2020-07-15 RX ORDER — PANTOPRAZOLE SODIUM 20 MG/1
20 TABLET, DELAYED RELEASE ORAL 2 TIMES DAILY
COMMUNITY
End: 2022-06-30

## 2020-07-15 RX ORDER — SODIUM CHLORIDE 9 MG/ML
1000 INJECTION, SOLUTION INTRAVENOUS ONCE
Status: COMPLETED | OUTPATIENT
Start: 2020-07-15 | End: 2020-07-15

## 2020-07-15 RX ORDER — SODIUM CHLORIDE, SODIUM LACTATE, POTASSIUM CHLORIDE, CALCIUM CHLORIDE 600; 310; 30; 20 MG/100ML; MG/100ML; MG/100ML; MG/100ML
INJECTION, SOLUTION INTRAVENOUS CONTINUOUS
Status: DISCONTINUED | OUTPATIENT
Start: 2020-07-15 | End: 2020-07-15 | Stop reason: HOSPADM

## 2020-07-15 RX ORDER — DEXAMETHASONE SODIUM PHOSPHATE 4 MG/ML
INJECTION, SOLUTION INTRA-ARTICULAR; INTRALESIONAL; INTRAMUSCULAR; INTRAVENOUS; SOFT TISSUE PRN
Status: DISCONTINUED | OUTPATIENT
Start: 2020-07-15 | End: 2020-07-15 | Stop reason: SURG

## 2020-07-15 RX ORDER — BUPIVACAINE HYDROCHLORIDE AND EPINEPHRINE 5; 5 MG/ML; UG/ML
INJECTION, SOLUTION EPIDURAL; INTRACAUDAL; PERINEURAL
Status: DISCONTINUED | OUTPATIENT
Start: 2020-07-15 | End: 2020-07-15 | Stop reason: HOSPADM

## 2020-07-15 RX ORDER — ONDANSETRON 2 MG/ML
4 INJECTION INTRAMUSCULAR; INTRAVENOUS
Status: DISCONTINUED | OUTPATIENT
Start: 2020-07-15 | End: 2020-07-15 | Stop reason: HOSPADM

## 2020-07-15 RX ORDER — DIPHENHYDRAMINE HYDROCHLORIDE 50 MG/ML
12.5 INJECTION INTRAMUSCULAR; INTRAVENOUS
Status: DISCONTINUED | OUTPATIENT
Start: 2020-07-15 | End: 2020-07-15 | Stop reason: HOSPADM

## 2020-07-15 RX ORDER — HYDROMORPHONE HYDROCHLORIDE 1 MG/ML
0.2 INJECTION, SOLUTION INTRAMUSCULAR; INTRAVENOUS; SUBCUTANEOUS
Status: DISCONTINUED | OUTPATIENT
Start: 2020-07-15 | End: 2020-07-15 | Stop reason: HOSPADM

## 2020-07-15 RX ORDER — OXYCODONE HYDROCHLORIDE AND ACETAMINOPHEN 5; 325 MG/1; MG/1
1 TABLET ORAL EVERY 4 HOURS PRN
Qty: 15 TAB | Refills: 0 | Status: SHIPPED | OUTPATIENT
Start: 2020-07-15 | End: 2020-07-22

## 2020-07-15 RX ORDER — POLYETHYLENE GLYCOL 3350 17 G/17G
17 POWDER, FOR SOLUTION ORAL
Qty: 500 G | Status: SHIPPED | OUTPATIENT
Start: 2020-07-15 | End: 2022-06-30

## 2020-07-15 RX ORDER — DIPHENHYDRAMINE HYDROCHLORIDE 50 MG/ML
50 INJECTION INTRAMUSCULAR; INTRAVENOUS ONCE
Status: COMPLETED | OUTPATIENT
Start: 2020-07-15 | End: 2020-07-15

## 2020-07-15 RX ORDER — HYDROMORPHONE HYDROCHLORIDE 1 MG/ML
0.1 INJECTION, SOLUTION INTRAMUSCULAR; INTRAVENOUS; SUBCUTANEOUS
Status: DISCONTINUED | OUTPATIENT
Start: 2020-07-15 | End: 2020-07-15 | Stop reason: HOSPADM

## 2020-07-15 RX ORDER — HYDROMORPHONE HYDROCHLORIDE 1 MG/ML
0.4 INJECTION, SOLUTION INTRAMUSCULAR; INTRAVENOUS; SUBCUTANEOUS
Status: DISCONTINUED | OUTPATIENT
Start: 2020-07-15 | End: 2020-07-15 | Stop reason: HOSPADM

## 2020-07-15 RX ORDER — HALOPERIDOL 5 MG/ML
1 INJECTION INTRAMUSCULAR
Status: DISCONTINUED | OUTPATIENT
Start: 2020-07-15 | End: 2020-07-15 | Stop reason: HOSPADM

## 2020-07-15 RX ORDER — AMOXICILLIN AND CLAVULANATE POTASSIUM 875; 125 MG/1; MG/1
1 TABLET, FILM COATED ORAL 2 TIMES DAILY
Qty: 14 TAB | Refills: 0 | Status: SHIPPED | OUTPATIENT
Start: 2020-07-15 | End: 2022-06-30

## 2020-07-15 RX ORDER — ONDANSETRON 2 MG/ML
INJECTION INTRAMUSCULAR; INTRAVENOUS PRN
Status: DISCONTINUED | OUTPATIENT
Start: 2020-07-15 | End: 2020-07-15 | Stop reason: SURG

## 2020-07-15 RX ORDER — CEFOTETAN DISODIUM 2 G/20ML
INJECTION, POWDER, FOR SOLUTION INTRAMUSCULAR; INTRAVENOUS PRN
Status: DISCONTINUED | OUTPATIENT
Start: 2020-07-15 | End: 2020-07-15 | Stop reason: SURG

## 2020-07-15 RX ADMIN — FENTANYL CITRATE 50 MCG: 50 INJECTION, SOLUTION INTRAMUSCULAR; INTRAVENOUS at 17:39

## 2020-07-15 RX ADMIN — CEFTRIAXONE SODIUM 1 G: 1 INJECTION, POWDER, FOR SOLUTION INTRAMUSCULAR; INTRAVENOUS at 13:12

## 2020-07-15 RX ADMIN — SUGAMMADEX 200 MG: 100 INJECTION, SOLUTION INTRAVENOUS at 17:38

## 2020-07-15 RX ADMIN — FENTANYL CITRATE 50 MCG: 50 INJECTION, SOLUTION INTRAMUSCULAR; INTRAVENOUS at 17:29

## 2020-07-15 RX ADMIN — PROPOFOL 150 MG: 10 INJECTION, EMULSION INTRAVENOUS at 16:53

## 2020-07-15 RX ADMIN — MIDAZOLAM HYDROCHLORIDE 2 MG: 1 INJECTION, SOLUTION INTRAMUSCULAR; INTRAVENOUS at 16:50

## 2020-07-15 RX ADMIN — FENTANYL CITRATE 100 MCG: 50 INJECTION, SOLUTION INTRAMUSCULAR; INTRAVENOUS at 16:53

## 2020-07-15 RX ADMIN — OXYCODONE HYDROCHLORIDE 10 MG: 5 SOLUTION ORAL at 18:11

## 2020-07-15 RX ADMIN — ONDANSETRON 4 MG: 2 INJECTION INTRAMUSCULAR; INTRAVENOUS at 16:57

## 2020-07-15 RX ADMIN — DEXAMETHASONE SODIUM PHOSPHATE 4 MG: 4 INJECTION, SOLUTION INTRAMUSCULAR; INTRAVENOUS at 16:57

## 2020-07-15 RX ADMIN — FENTANYL CITRATE 50 MCG: 50 INJECTION, SOLUTION INTRAMUSCULAR; INTRAVENOUS at 17:07

## 2020-07-15 RX ADMIN — METRONIDAZOLE 500 MG: 500 INJECTION, SOLUTION INTRAVENOUS at 13:12

## 2020-07-15 RX ADMIN — SODIUM CHLORIDE, POTASSIUM CHLORIDE, SODIUM LACTATE AND CALCIUM CHLORIDE: 600; 310; 30; 20 INJECTION, SOLUTION INTRAVENOUS at 15:52

## 2020-07-15 RX ADMIN — CEFOTETAN DISODIUM 2 G: 2 INJECTION, POWDER, FOR SOLUTION INTRAMUSCULAR; INTRAVENOUS at 16:57

## 2020-07-15 RX ADMIN — DIPHENHYDRAMINE HYDROCHLORIDE 50 MG: 50 INJECTION, SOLUTION INTRAMUSCULAR; INTRAVENOUS at 10:29

## 2020-07-15 RX ADMIN — SODIUM CHLORIDE 1000 ML: 9 INJECTION, SOLUTION INTRAVENOUS at 10:30

## 2020-07-15 RX ADMIN — IOHEXOL 100 ML: 350 INJECTION, SOLUTION INTRAVENOUS at 10:48

## 2020-07-15 RX ADMIN — LIDOCAINE HYDROCHLORIDE 5 ML: 20 INJECTION, SOLUTION EPIDURAL; INFILTRATION; INTRACAUDAL; PERINEURAL at 16:53

## 2020-07-15 ASSESSMENT — FIBROSIS 4 INDEX: FIB4 SCORE: 1.02

## 2020-07-15 NOTE — ED NOTES
Pt ambulates back to room, gait steady, clean catch urine sample obtained and taken to lab  Chart up for ERP

## 2020-07-15 NOTE — ED NOTES
Pt last ate at 1900 7/14/2020, last drink was water at 0830 this morning 7/15/2020, pt updated NPO surgery scheduled at 1600 with Dr Valdez.  Will call OR and update ([phone]).    Covid swab obtained, labeled and sent to lab.

## 2020-07-15 NOTE — H&P
CC: abdominal pain    HPI: 45y F here with abdominal pain since yesterday which is worsening with time.  She also describes emesis along with the pain.  She has an increasingly hard time walking and loss of appetite as well.  Pain is located in the RLQ.      PMHx: none    PSHx: none    Meds: see Med Rec, no anticoagulation    All: Iodine    FamHx: no colon/rectal cancers, no other pertinent family history    SocHx: No Tob/Drugs, occasional EtOH      ROS: negative except as above    Consitutional- above  HEENT- no visual changes, no sneezing or runny nose  Skin- no rashes or itching  Cardiovascular- no chest pain or palpatations  Respiratory- no SOB or cough  GI- above  - no dysurea  Neuro- no weakness or syncope  Musculoskeletal- no muscle or joint pain  Heme- no bleeding or bruising  Lymphatic- no enlarged nodes or previous splenectomy  Endocrine- No sweating or heat/cold intolerance  Allergy- No asthma or hives  Psychiatric- no depression or anxiety        Physical Exam:   AFVSS  A@O x3, NAD  NCAT, no scleral icterus  Neck nontender, no lymphadenopathy  Normal respiratory effort, no chest wall masses  RRR, 2+ pulses  Abdomen soft, no peritonitis, no masses  Extremities warm and well perfused  No skin rashes or lesions    Labs: WBC 10.4, Hct 39, Plt 218  K 3.3., otherwise lytes wnl    Radiology: CT A/P:   Acute appendicitis with severe adjacent fat stranding indicating phlegmonous change. No abscess or free air is identified       Trace free pelvic fluid could be related to acute appendicitis although this also is within normal physiologic limits       Hepatomegaly         A/P: 45y F here with Acute Appendicitis.  Explained risks/benefits/alternatives of surgery to her.  She is NPO.  To OR for Lap Appy

## 2020-07-15 NOTE — ANESTHESIA PREPROCEDURE EVALUATION
Relevant Problems   CARDIAC   (+) Migraine       Physical Exam    Airway   Mallampati: II  TM distance: >3 FB  Neck ROM: full       Cardiovascular - normal exam  Rhythm: regular  Rate: normal  (-) murmur     Dental - normal exam           Pulmonary - normal exam  Breath sounds clear to auscultation     Abdominal    Neurological - normal exam                 Anesthesia Plan    ASA 1       Plan - general       Airway plan will be ETT        Induction: intravenous    Postoperative Plan: Postoperative administration of opioids is intended.    Pertinent diagnostic labs and testing reviewed    Informed Consent:    Anesthetic plan and risks discussed with patient.    Use of blood products discussed with: patient whom consented to blood products.

## 2020-07-15 NOTE — ED TRIAGE NOTES
Pt amb to triage c/o RLQ pain x2d; pt c/o intermittent abd pain throughout her abd x 'several months'; pt states pain localized at RLQ x2d ago. Pt called pcc and instr to go to er

## 2020-07-15 NOTE — ANESTHESIA PROCEDURE NOTES
Airway    Date/Time: 7/15/2020 4:55 PM  Performed by: Dominic Jaime M.D.  Authorized by: Dominic Jaime M.D.     Location:  OR  Urgency:  Elective  Indications for Airway Management:  Anesthesia      Spontaneous Ventilation: absent    Sedation Level:  Deep  Preoxygenated: Yes    Patient Position:  Sniffing  Mask Difficulty Assessment:  0 - not attempted  Final Airway Type:  Endotracheal airway  Final Endotracheal Airway:  ETT  Cuffed: Yes    Technique Used for Successful ETT Placement:  Direct laryngoscopy    Insertion Site:  Oral  Blade Type:  Maria E  Laryngoscope Blade/Videolaryngoscope Blade Size:  3  ETT Size (mm):  7.0  Measured from:  Teeth  ETT to Teeth (cm):  23  Placement Verified by: auscultation and capnometry    Cormack-Lehane Classification:  Grade I - full view of glottis  Number of Attempts at Approach:  1

## 2020-07-15 NOTE — ED NOTES
ERP at bedside. Pt agrees with plan of care discussed by ERP. AIDET acknowledged with patient. Bobbi in low position, side rail up for pt safety. Call light within reach. Will continue to monitor.

## 2020-07-15 NOTE — OR NURSING
1508: Rcvd report from SK[facility] Arellano. Will arrange pt to be brought to pre-op holding when we have a (-) COVID test.   1530: Results are (-), transport called and they will bring pt up from the ED.  1541: Patient allergies and NPO status verified, home medication reconciliation completed and belongings secured. Patient verbalizes understanding of pain scale, expected course of stay and plan of care. Surgical site verified with patient. IV access established. Sequentials placed on legs.

## 2020-07-15 NOTE — ED PROVIDER NOTES
ED Provider Note    CHIEF COMPLAINT  Chief Complaint   Patient presents with   • RLQ Pain     x2d        HPI  Lizzy Kc is a 45 y.o. female who presents to the ED with complaints of right lower quadrant abdominal pain.  The patient states on Monday started having achy pain on that area that is progressively gotten worse to her last night she had a hard time even walking she has a decrease in appetite.  Patient denies any overt fevers scribes intermittent chills denies any dysuria she did have a little bit of diarrhea but none today.  She contacted her primary care physician who recommended that she come to the emergency department for evaluation.  Upon arrival she describes the pain as an 8/10 type pain located the right lower quadrant region worse with pressing on it worse with walking and movement.    REVIEW OF SYSTEMS  See HPI for further details. All other systems are negative.     PAST MEDICAL HISTORY  No past medical history on file.    FAMILY HISTORY  No family history on file.    SOCIAL HISTORY  Social History     Socioeconomic History   • Marital status: Single     Spouse name: Not on file   • Number of children: Not on file   • Years of education: Not on file   • Highest education level: Not on file   Occupational History   • Not on file   Social Needs   • Financial resource strain: Not on file   • Food insecurity     Worry: Not on file     Inability: Not on file   • Transportation needs     Medical: Not on file     Non-medical: Not on file   Tobacco Use   • Smoking status: Never Smoker   • Smokeless tobacco: Never Used   Substance and Sexual Activity   • Alcohol use: Yes     Comment: occasional   • Drug use: No   • Sexual activity: Not on file   Lifestyle   • Physical activity     Days per week: Not on file     Minutes per session: Not on file   • Stress: Not on file   Relationships   • Social connections     Talks on phone: Not on file     Gets together: Not on file     Attends [employer] service: Not  "on file     Active member of club or organization: Not on file     Attends meetings of clubs or organizations: Not on file     Relationship status: Not on file   • Intimate partner violence     Fear of current or ex partner: Not on file     Emotionally abused: Not on file     Physically abused: Not on file     Forced sexual activity: Not on file   Other Topics Concern   • Not on file   Social History Narrative   • Not on file      Ivy A Marlene, A.P.N.      SURGICAL HISTORY  No past surgical history on file.    CURRENT MEDICATIONS  Home Medications     Reviewed by Kristel Edwards (Pharmacy Tech) on 07/15/20 at 0959  Med List Status: Complete   Medication Last Dose Status   norgestrel-ethinyl estradiol (LO-OVRAL) 0.3-30 MG-MCG Tab 7/14/2020 Active   pantoprazole (PROTONIX) 20 MG tablet 7/15/2020 Active                ALLERGIES  Allergies   Allergen Reactions   • Iodine      Hives, difficulty breathing  RXN 13 years old       PHYSICAL EXAM  VITAL SIGNS: /68   Pulse 76   Temp 36.9 °C (98.4 °F) (Temporal)   Resp 16   Ht 1.727 m (5' 8\")   Wt 71.3 kg (157 lb 3 oz)   SpO2 99%   BMI 23.90 kg/m²    Pulse Oximetry was obtained. It showed a reading of Pulse Oximetry: 96 %.  I interpreted this as nonhypoxic.     Constitutional: Well developed, Well nourished, No acute distress, Non-toxic appearance.   HENT: Normocephalic, Atraumatic, Bilateral external ears normal, bilateral tympanic membranes normal, Oropharynx dry mucous membranes, No oral exudates, Nose normal.   Eyes:  conjunctiva is normal, there are no signs of exudate.   Neck: Supple, no cervical lymphadenopathy, no meningeal signs..   Lymphatic: No lymphadenopathy noted.   Cardiovascular: Regular rate and rhythm without murmurs gallops or rubs.   Thorax & Lungs: Lungs are clear to auscultation bilaterally, there are no wheezes no rales. Chest wall is nontender.  Abdomen: Soft, tender in the right lower quadrant region she has a positive Rovsing sign " positive rebound tenderness.  Skin: Warm, Dry, No erythema,   Back: No tenderness, No CVA tenderness.  Musculoskeletal: Good range of motion in all major joints. No tenderness to palpation or major deformities noted. Intact distal pulses, no clubbing, no cyanosis, no edema     Neurologic: Alert & oriented x 3, Normal motor function, Normal sensory function, No focal deficits noted.   Psychiatric: Affect normal, Judgment normal, Mood normal.         RADIOLOGY/PROCEDURES  CT-ABDOMEN-PELVIS WITH   Final Result      Acute appendicitis with severe adjacent fat stranding indicating phlegmonous change. No abscess or free air is identified      Trace free pelvic fluid could be related to acute appendicitis although this also is within normal physiologic limits      Hepatomegaly      Findings were discussed with YENNIFER ROSA on 7/15/2020 10:54 AM.          Results for orders placed or performed during the hospital encounter of 07/15/20   CBC WITH DIFFERENTIAL   Result Value Ref Range    WBC 10.4 4.8 - 10.8 K/uL    RBC 4.52 4.20 - 5.40 M/uL    Hemoglobin 13.3 12.0 - 16.0 g/dL    Hematocrit 39.3 37.0 - 47.0 %    MCV 86.9 81.4 - 97.8 fL    MCH 29.4 27.0 - 33.0 pg    MCHC 33.8 33.6 - 35.0 g/dL    RDW 41.1 35.9 - 50.0 fL    Platelet Count 218 164 - 446 K/uL    MPV 10.6 9.0 - 12.9 fL    Neutrophils-Polys 65.40 44.00 - 72.00 %    Lymphocytes 25.90 22.00 - 41.00 %    Monocytes 7.00 0.00 - 13.40 %    Eosinophils 0.50 0.00 - 6.90 %    Basophils 0.80 0.00 - 1.80 %    Immature Granulocytes 0.40 0.00 - 0.90 %    Nucleated RBC 0.00 /100 WBC    Neutrophils (Absolute) 6.78 2.00 - 7.15 K/uL    Lymphs (Absolute) 2.68 1.00 - 4.80 K/uL    Monos (Absolute) 0.73 0.00 - 0.85 K/uL    Eos (Absolute) 0.05 0.00 - 0.51 K/uL    Baso (Absolute) 0.08 0.00 - 0.12 K/uL    Immature Granulocytes (abs) 0.04 0.00 - 0.11 K/uL    NRBC (Absolute) 0.00 K/uL   COMP METABOLIC PANEL   Result Value Ref Range    Sodium 135 135 - 145 mmol/L    Potassium 3.3 (L) 3.6 -  5.5 mmol/L    Chloride 97 96 - 112 mmol/L    Co2 27 20 - 33 mmol/L    Anion Gap 11.0 7.0 - 16.0    Glucose 107 (H) 65 - 99 mg/dL    Bun 8 8 - 22 mg/dL    Creatinine 0.69 0.50 - 1.40 mg/dL    Calcium 9.5 8.4 - 10.2 mg/dL    AST(SGOT) 19 12 - 45 U/L    ALT(SGPT) 14 2 - 50 U/L    Alkaline Phosphatase 82 30 - 99 U/L    Total Bilirubin 0.7 0.1 - 1.5 mg/dL    Albumin 4.6 3.2 - 4.9 g/dL    Total Protein 8.0 6.0 - 8.2 g/dL    Globulin 3.4 1.9 - 3.5 g/dL    A-G Ratio 1.4 g/dL   LIPASE   Result Value Ref Range    Lipase 17 7 - 58 U/L   URINALYSIS,CULTURE IF INDICATED    Specimen: Urine, Clean Catch; Blood   Result Value Ref Range    Color Yellow     Character Clear     Specific Gravity 1.010 <1.035    Ph 6.0 5.0 - 8.0    Glucose Negative Negative mg/dL    Ketones Negative Negative mg/dL    Protein Negative Negative mg/dL    Bilirubin Negative Negative    Nitrite Negative Negative    Leukocyte Esterase Negative Negative    Occult Blood Small (A) Negative    Micro Urine Req Microscopic    HCG QUAL SERUM   Result Value Ref Range    Beta-Hcg Qualitative Serum Negative Negative   URINE MICROSCOPIC (W/UA)   Result Value Ref Range    WBC 2-5 /hpf    RBC 2-5 (A) /hpf    Bacteria Rare (A) None /hpf    Epithelial Cells Few Few /hpf   ESTIMATED GFR   Result Value Ref Range    GFR If African American >60 >60 mL/min/1.73 m 2    GFR If Non African American >60 >60 mL/min/1.73 m 2         COURSE & MEDICAL DECISION MAKING  Pertinent Labs & Imaging studies reviewed. (See chart for details)  Patient presents emerged department for evaluation.  Clinically I do feel is most likely appendicitis however ovarian causes was also positive and possible.  White blood cell count is completely normal.  CT scan does show positive appendicitis on CT scan.  Patient was given a liter bolus of normal saline and is actually feeling improved after the fluid bolus but still is rather tender in the right lower quadrant region.  After the identification of  appendicitis I did start the patient with Rocephin as well as Flagyl and I spoke with Dr. Valdez who will take the patient to the operating room for laparoscopic appendectomy.  Currently the patient is stable and will be admitted for further treatment and care.    FINAL IMPRESSION  1. Acute appendicitis with localized peritonitis, without perforation, abscess, or gangrene                 Electronically signed by: Nestor Jones M.D., 7/15/2020 10:04 AM

## 2020-07-16 NOTE — DISCHARGE INSTRUCTIONS
ACTIVITY: Rest and take it easy for the first 24 hours.  A responsible adult is recommended to remain with you during that time.  It is normal to feel sleepy.  We encourage you to not do anything that requires balance, judgment or coordination.    MILD FLU-LIKE SYMPTOMS ARE NORMAL. YOU MAY EXPERIENCE GENERALIZED MUSCLE ACHES, THROAT IRRITATION, HEADACHE AND/OR SOME NAUSEA.    FOR 24 HOURS DO NOT:  Drive, operate machinery or run household appliances.  Drink beer or alcoholic beverages.   Make important decisions or sign legal documents.    SPECIAL INSTRUCTIONS: no heavy lifting or strenuous activity until cleared by MD    DIET: To avoid nausea, slowly advance diet as tolerated, avoiding spicy or greasy foods for the first day.  Add more substantial food to your diet according to your physician's instructions.  Babies can be fed formula or breast milk as soon as they are hungry.  INCREASE FLUIDS AND FIBER TO AVOID CONSTIPATION.    SURGICAL DRESSING/BATHING: OK to shower tomorrow. No bathes, pools or hot tubs for 2 weeks. Do not submerge wounds.     FOLLOW-UP APPOINTMENT:  A follow-up appointment should be arranged with your doctor; call to schedule.    You should CALL YOUR PHYSICIAN if you develop:  Fever greater than 101 degrees F.  Pain not relieved by medication, or persistent nausea or vomiting.  Excessive bleeding (blood soaking through dressing) or unexpected drainage from the wound.  Extreme redness or swelling around the incision site, drainage of pus or foul smelling drainage.  Inability to urinate or empty your bladder within 8 hours.  Problems with breathing or chest pain.    You should call 911 if you develop problems with breathing or chest pain.  If you are unable to contact your doctor or surgical center, you should go to the nearest emergency room or urgent care center.  Physician's telephone #: [phone] Dr Valdez    If any questions arise, call your doctor.  If your doctor is not available,  please feel free to call the Surgical Center at [phone].  The Center is open Monday through Friday from 7AM to 7PM.  You can also call the HEALTH HOTLINE open 24 hours/day, 7 days/week and speak to a nurse at [phone], or toll free at [phone].    A registered nurse may call you a few days after your surgery to see how you are doing after your procedure.    MEDICATIONS: Resume taking daily medication.  Take prescribed pain medication with food.  If no medication is prescribed, you may take non-aspirin pain medication if needed.  PAIN MEDICATION CAN BE VERY CONSTIPATING.  Take a stool softener or laxative such as senokot, pericolace, or milk of magnesia if needed.    Prescription given for Percocet, Augmentin, and Miralax.  Last pain medication given at 6:11pm 10 mg oxycodone .    If your physician has prescribed pain medication that includes Acetaminophen (Tylenol), do not take additional Acetaminophen (Tylenol) while taking the prescribed medication.    Depression / Suicide Risk    As you are discharged from this [facility] facility, it is important to learn how to keep safe from harming yourself.    Recognize the warning signs:  · Abrupt changes in personality, positive or negative- including increase in energy   · Giving away possessions  · Change in eating patterns- significant weight changes-  positive or negative  · Change in sleeping patterns- unable to sleep or sleeping all the time   · Unwillingness or inability to communicate  · Depression  · Unusual sadness, discouragement and loneliness  · Talk of wanting to die  · Neglect of personal appearance   · Rebelliousness- reckless behavior  · Withdrawal from people/activities they love  · Confusion- inability to concentrate     If you or a loved one observes any of these behaviors or has concerns about self-harm, here's what you can do:  · Talk about it- your feelings and reasons for harming yourself  · Remove any means that you might use  to hurt yourself (examples: pills, rope, extension cords, firearm)  · Get professional help from the community (Mental Health, Substance Abuse, psychological counseling)  · Do not be alone:Call your Safe Contact- someone whom you trust who will be there for you.  · Call your local CRISIS HOTLINE 337-0577 or 736-246-4629  · Call your local Children's Mobile Crisis Response Team [place] (642) 869-5690 or www.RuckPack  · Call the toll free National Suicide Prevention Hotlines   · National Suicide Prevention Lifeline 548-989-ZBER (9861)  · National Hope Line Network 800-SUICIDE (106-5719)

## 2020-07-16 NOTE — OR NURSING
1752 Pt arrived from OR post APPENDECTOMY, LAPAROSCOPIC , report received. Pt breathing unlabored, OPA in place with clear lung sounds bilat.    1715 Pt daughter updated on progress, POC and ETA for DC    1835 Pt states pain is controled and tolerable, denies nausea. Pt tolerating PO fluids. Pt to phase II    1853 Discharge instructions and medications gone over with Pt and ride. All questions answered. Pt meets DC criteria. PIV DC'd. Pt transported to POV via WC in stable condition.      Statement Selected

## 2020-07-16 NOTE — ANESTHESIA POSTPROCEDURE EVALUATION
Patient: Lizzy Kc    Procedure Summary     Date:  07/15/20 Room / Location:  [place] [address] / SURGERY [facility]    Anesthesia Start:  1650 Anesthesia Stop:  1752    Procedure:  APPENDECTOMY, LAPAROSCOPIC (N/A Abdomen) Diagnosis:  (acute appendicitis)    Surgeon:  Hima Valdez M.D. Responsible Provider:  Dominic Jaime M.D.    Anesthesia Type:  general ASA Status:  1          Final Anesthesia Type: general  Last vitals  BP   Blood Pressure: 133/78    Temp   36.6 °C (97.9 °F)    Pulse   Pulse: 76   Resp   (!) 21    SpO2   98 %      Anesthesia Post Evaluation    Patient location during evaluation: PACU  Patient participation: complete - patient participated  Level of consciousness: awake and alert    Airway patency: patent  Anesthetic complications: no  Cardiovascular status: hemodynamically stable  Respiratory status: acceptable  Hydration status: euvolemic    PONV: none           Nurse Pain Score: 0 (NPRS)

## 2020-07-16 NOTE — OR SURGEON
Immediate Post OP Note    PreOp Diagnosis: Appendicitis    PostOp Diagnosis: Suppurative Appendicitis    Procedure(s):  APPENDECTOMY, LAPAROSCOPIC - Wound Class: Contaminated    Surgeon(s):  Hima Valdez M.D.    Anesthesiologist/Type of Anesthesia:  Anesthesiologist: Dominic Jaime M.D./General    Surgical Staff:  Assistant: ALEXANDRIA Castellano  Circulator: Karol Aiken R.N.  Scrub Person: Toni Cummings; Shaheen De La Fuente    Specimens removed if any:  ID Type Source Tests Collected by Time Destination   A : appendix Tissue Appendix PATHOLOGY SPECIMEN Hima Valdez M.D. 7/15/2020  3:58 PM        Estimated Blood Loss: 25cc    Findings: Inflamed appendix with contained purulent perforation removed with hemostasis    Complications: none        7/15/2020 5:39 PM Hima Valdez M.D.

## 2020-07-16 NOTE — ANESTHESIA QCDR
2019 [facility] Clinical Data Registry (for Quality Improvement)     Postoperative nausea/vomiting risk protocol (Adult = 18 yrs and Pediatric 3-17 yrs)- (430 and 463)  General inhalation anesthetic (NOT TIVA) with PONV risk factors: Yes  Provision of anti-emetic therapy with at least 2 different classes of agents: Yes   Patient DID NOT receive anti-emetic therapy and reason is documented in Medical Record:  N/A    Multimodal Pain Management- (477)  Non-emergent surgery AND patient age >= 18: Yes  Use of Multimodal Pain Management, two or more drugs and/or interventions, NOT including systemic opioids: Yes  Exception: Documented allergy to multiple classes of analgesics: N/A    Smoking Abstinence (404)  Patient is current smoker (cigarette, pipe, e-cig, marijuanna): No  Elective Surgery:   Abstinence instructions provided prior to day of surgery:   Patient abstained from smoking on day of surgery:     Pre-Op Beta-Blocker in Isolated CABG (44)  Isolated CABG AND patient age >= 18:   Beta-blocker admin within 24 hours of surgical incision:   Exception:of medical reason(s) for not administering beta blocker within 24 hours prior to surgical incision (e.g., not  indicated,other medical reason):     PACU assessment of acute postoperative pain prior to Anesthesia Care End- Applies to Patients Age = 18- (ABG7)  Initial PACU pain score is which of the following: < 7/10  Patient unable to report pain score: N/A    Post-anesthetic transfer of care checklist/protocol to PACU/ICU- (426 and 427)  Upon conclusion of case, patient transferred to which of the following locations: PACU/Non-ICU  Use of transfer checklist/protocol: Yes  Exclusion: Service Performed in Patient Hospital Room (and thus did not require transfer): N/A  Unplanned admission to ICU related to anesthesia service up through end of PACU care- (MD51)  Unplanned admission to ICU (not initially anticipated at anesthesia start time): No

## 2020-07-16 NOTE — OP REPORT
DATE OF SERVICE:  07/15/2020    PREOPERATIVE DIAGNOSIS:  Appendicitis.    POSTOPERATIVE DIAGNOSIS:  Appendicitis.    PROCEDURE:  Laparoscopic appendectomy.    SURGEON:  Hima Valdez MD.    ASSISTANT:  FELIBERTO Watt.    ANESTHESIA:  General endotracheal anesthesia.    ESTIMATED BLOOD LOSS:  25 mL.    SPECIMENS:  Appendix.    COMPLICATIONS:  None.    CONDITION:  Stable.    INDICATIONS FOR PROCEDURE:  This is a 45-year-old female who presents with   acute onset right lower quadrant abdominal pain and CT confirmed appendicitis.    Risks, benefits, and alternatives of surgery were explained to her before   proceeding.    OPERATIVE FINDINGS:  Inflamed suppurative appendicitis with contained purulent   perforation within omentum.  Appendix removed with hemostasis.    OPERATIVE TECHNIQUE:  After informed consent was obtained, the patient was   taken to the operating room, placed in supine position.  After adequate   endotracheal anesthesia was achieved, the abdomen was prepped and draped in   sterile fashion.  Operation was begun by placing a 5 mm periumbilical incision   through which a 5 mm trocar was introduced into the abdomen using the   Optiview technique.  After pneumoperitoneum was achieved, additional trocars   were placed, a 5 mm in the suprapubic midline and a 12 mm trocar in the left   lower quadrant.  All trocars were placed with 0.5% Marcaine with epinephrine   for local anesthesia.    The appendix was noted to be contained within an inflamed mass of omentum in   the right lower quadrant.  We carefully peeled this off the lateral abdominal   wall.  We then teased out the tip of the appendix, which was embedded within   the omentum.  Upon doing so, we noted some purulent material within a   contained perforation.  This was all suctioned free before it was spilled   within the abdominal cavity and the area cleaned out.  We finished then   peeling the appendix off of the omentum and then divided the  appendiceal   mesentery with an EnSeal device.  We then fired a blue load stapler across the   appendiceal base  it from the cecum at a healthy point.  The   appendix was placed in an EndoCatch bag and removed after dilation from the 12   mm trocar site.    There was some further bleeding from the appendiceal mesentery, so we ended up   dissecting this further and then divided  it again, this time with a white   load staple fire.  We had to place some additional hemostatic clips into the   mesentery, retroperitoneum as well as the staple line across the cecum.  With   all this being completed, no further bleeding was noted.  We irrigated the   abdomen, suctioning free all spilled blood and irrigating out the pelvis,   right gutter and above the liver.  We irrigated the omentum as well.  With   this completed, we placed Surgiflo along the inflamed portion of the omentum   as well as in the retroperitoneum at the site of the appendiceal mesentery.    With this completed, hemostasis was noted and we closed the extraction site   with an 0 PDS using an Endoclose device.    Pneumoperitoneum was reduced and all trocars were removed.  Skin incisions   were closed with 4-0 Monocryl.  Dermabond was placed on all wounds.  The   patient returned to the PACU in stable condition.  All instruments counts were   correct at the end of the procedure.       ____________________________________     MD KATHI Fong / RANDA    DD:  07/15/2020 17:50:11  DT:  07/15/2020 21:17:18    D#:  [id number]  Job#:  [id number]

## 2020-07-16 NOTE — ANESTHESIA TIME REPORT
Anesthesia Start and Stop Event Times     Date Time Event    7/15/2020 1607 Ready for Procedure     1650 Anesthesia Start     1752 Anesthesia Stop        Responsible Staff  07/15/20    Name Role Begin End    Dominic Jaime M.D. Anesth 1650 1752        Preop Diagnosis (Free Text):  Pre-op Diagnosis     acute appendicitis        Preop Diagnosis (Codes):    Post op Diagnosis  Acute appendicitis      Premium Reason  K. Alert    Comments:

## 2020-09-02 ENCOUNTER — HOSPITAL ENCOUNTER (OUTPATIENT)
Dept: LAB | Facility: MEDICAL CENTER | Age: 45
End: 2020-09-02
Attending: PHYSICIAN ASSISTANT
Payer: COMMERCIAL

## 2020-09-02 LAB — CYTOLOGY REG CYTOL: NORMAL

## 2020-09-02 PROCEDURE — 88175 CYTOPATH C/V AUTO FLUID REDO: CPT

## 2020-10-09 ENCOUNTER — HOSPITAL ENCOUNTER (OUTPATIENT)
Dept: RADIOLOGY | Facility: MEDICAL CENTER | Age: 45
End: 2020-10-09
Attending: NURSE PRACTITIONER
Payer: COMMERCIAL

## 2020-10-09 DIAGNOSIS — Z12.31 VISIT FOR SCREENING MAMMOGRAM: ICD-10-CM

## 2020-10-09 PROCEDURE — 77067 SCR MAMMO BI INCL CAD: CPT

## 2021-08-13 ENCOUNTER — APPOINTMENT (OUTPATIENT)
Dept: RADIOLOGY | Facility: MEDICAL CENTER | Age: 46
End: 2021-08-13
Attending: EMERGENCY MEDICINE
Payer: COMMERCIAL

## 2021-08-13 ENCOUNTER — HOSPITAL ENCOUNTER (EMERGENCY)
Facility: MEDICAL CENTER | Age: 46
End: 2021-08-13
Attending: EMERGENCY MEDICINE
Payer: COMMERCIAL

## 2021-08-13 VITALS
DIASTOLIC BLOOD PRESSURE: 82 MMHG | WEIGHT: 144.4 LBS | HEIGHT: 68 IN | TEMPERATURE: 101.3 F | BODY MASS INDEX: 21.89 KG/M2 | SYSTOLIC BLOOD PRESSURE: 131 MMHG | HEART RATE: 85 BPM | RESPIRATION RATE: 16 BRPM | OXYGEN SATURATION: 99 %

## 2021-08-13 DIAGNOSIS — N12 PYELONEPHRITIS: ICD-10-CM

## 2021-08-13 LAB
ALBUMIN SERPL BCP-MCNC: 3.9 G/DL (ref 3.2–4.9)
ALBUMIN/GLOB SERPL: 1 G/DL
ALP SERPL-CCNC: 82 U/L (ref 30–99)
ALT SERPL-CCNC: 43 U/L (ref 2–50)
ANION GAP SERPL CALC-SCNC: 10 MMOL/L (ref 7–16)
APPEARANCE UR: ABNORMAL
AST SERPL-CCNC: 62 U/L (ref 12–45)
BACTERIA #/AREA URNS HPF: ABNORMAL /HPF
BASOPHILS # BLD AUTO: 0.7 % (ref 0–1.8)
BASOPHILS # BLD: 0.05 K/UL (ref 0–0.12)
BILIRUB SERPL-MCNC: 0.3 MG/DL (ref 0.1–1.5)
BILIRUB UR QL STRIP.AUTO: NEGATIVE
BUN SERPL-MCNC: 11 MG/DL (ref 8–22)
CALCIUM SERPL-MCNC: 9.4 MG/DL (ref 8.4–10.2)
CHLORIDE SERPL-SCNC: 98 MMOL/L (ref 96–112)
CO2 SERPL-SCNC: 25 MMOL/L (ref 20–33)
COLOR UR: ABNORMAL
CREAT SERPL-MCNC: 0.65 MG/DL (ref 0.5–1.4)
EOSINOPHIL # BLD AUTO: 0.18 K/UL (ref 0–0.51)
EOSINOPHIL NFR BLD: 2.4 % (ref 0–6.9)
EPI CELLS #/AREA URNS HPF: ABNORMAL /HPF
ERYTHROCYTE [DISTWIDTH] IN BLOOD BY AUTOMATED COUNT: 43.3 FL (ref 35.9–50)
GLOBULIN SER CALC-MCNC: 3.8 G/DL (ref 1.9–3.5)
GLUCOSE SERPL-MCNC: 107 MG/DL (ref 65–99)
GLUCOSE UR STRIP.AUTO-MCNC: ABNORMAL MG/DL
HCT VFR BLD AUTO: 40.5 % (ref 37–47)
HGB BLD-MCNC: 13.4 G/DL (ref 12–16)
IMM GRANULOCYTES # BLD AUTO: 0.04 K/UL (ref 0–0.11)
IMM GRANULOCYTES NFR BLD AUTO: 0.5 % (ref 0–0.9)
KETONES UR STRIP.AUTO-MCNC: ABNORMAL MG/DL
LACTATE BLD-SCNC: 1 MMOL/L (ref 0.5–2)
LEUKOCYTE ESTERASE UR QL STRIP.AUTO: ABNORMAL
LYMPHOCYTES # BLD AUTO: 1.35 K/UL (ref 1–4.8)
LYMPHOCYTES NFR BLD: 18.1 % (ref 22–41)
MCH RBC QN AUTO: 30.9 PG (ref 27–33)
MCHC RBC AUTO-ENTMCNC: 33.1 G/DL (ref 33.6–35)
MCV RBC AUTO: 93.3 FL (ref 81.4–97.8)
MICRO URNS: ABNORMAL
MONOCYTES # BLD AUTO: 0.72 K/UL (ref 0–0.85)
MONOCYTES NFR BLD AUTO: 9.7 % (ref 0–13.4)
NEUTROPHILS # BLD AUTO: 5.1 K/UL (ref 2–7.15)
NEUTROPHILS NFR BLD: 68.6 % (ref 44–72)
NITRITE UR QL STRIP.AUTO: POSITIVE
NRBC # BLD AUTO: 0 K/UL
NRBC BLD-RTO: 0 /100 WBC
PH UR STRIP.AUTO: 5 [PH] (ref 5–8)
PLATELET # BLD AUTO: 172 K/UL (ref 164–446)
PMV BLD AUTO: 10.7 FL (ref 9–12.9)
POTASSIUM SERPL-SCNC: 3.9 MMOL/L (ref 3.6–5.5)
PROT SERPL-MCNC: 7.7 G/DL (ref 6–8.2)
PROT UR QL STRIP: ABNORMAL MG/DL
RBC # BLD AUTO: 4.34 M/UL (ref 4.2–5.4)
RBC # URNS HPF: ABNORMAL /HPF
RBC UR QL AUTO: NEGATIVE
SODIUM SERPL-SCNC: 133 MMOL/L (ref 135–145)
SP GR UR STRIP.AUTO: 1.02
UNIDENT CRYS URNS QL MICRO: ABNORMAL /HPF
WBC # BLD AUTO: 7.4 K/UL (ref 4.8–10.8)
WBC #/AREA URNS HPF: ABNORMAL /HPF
YEAST #/AREA URNS HPF: ABNORMAL /HPF

## 2021-08-13 PROCEDURE — 83605 ASSAY OF LACTIC ACID: CPT

## 2021-08-13 PROCEDURE — 87086 URINE CULTURE/COLONY COUNT: CPT

## 2021-08-13 PROCEDURE — 96365 THER/PROPH/DIAG IV INF INIT: CPT

## 2021-08-13 PROCEDURE — 80053 COMPREHEN METABOLIC PANEL: CPT

## 2021-08-13 PROCEDURE — 700102 HCHG RX REV CODE 250 W/ 637 OVERRIDE(OP): Performed by: EMERGENCY MEDICINE

## 2021-08-13 PROCEDURE — 85025 COMPLETE CBC W/AUTO DIFF WBC: CPT

## 2021-08-13 PROCEDURE — 71045 X-RAY EXAM CHEST 1 VIEW: CPT

## 2021-08-13 PROCEDURE — 87040 BLOOD CULTURE FOR BACTERIA: CPT

## 2021-08-13 PROCEDURE — 700105 HCHG RX REV CODE 258: Performed by: EMERGENCY MEDICINE

## 2021-08-13 PROCEDURE — A9270 NON-COVERED ITEM OR SERVICE: HCPCS | Performed by: EMERGENCY MEDICINE

## 2021-08-13 PROCEDURE — 700111 HCHG RX REV CODE 636 W/ 250 OVERRIDE (IP): Performed by: EMERGENCY MEDICINE

## 2021-08-13 PROCEDURE — 81001 URINALYSIS AUTO W/SCOPE: CPT

## 2021-08-13 PROCEDURE — 99284 EMERGENCY DEPT VISIT MOD MDM: CPT

## 2021-08-13 RX ORDER — CEFDINIR 300 MG/1
300 CAPSULE ORAL 2 TIMES DAILY
Qty: 14 CAPSULE | Refills: 0 | Status: SHIPPED | OUTPATIENT
Start: 2021-08-13 | End: 2022-06-30

## 2021-08-13 RX ORDER — ONDANSETRON 4 MG/1
4 TABLET, ORALLY DISINTEGRATING ORAL EVERY 8 HOURS PRN
Qty: 10 TABLET | Refills: 0 | Status: SHIPPED | OUTPATIENT
Start: 2021-08-13 | End: 2022-06-30

## 2021-08-13 RX ORDER — IBUPROFEN 600 MG/1
600 TABLET ORAL ONCE
Status: COMPLETED | OUTPATIENT
Start: 2021-08-13 | End: 2021-08-13

## 2021-08-13 RX ORDER — PHENAZOPYRIDINE HYDROCHLORIDE 200 MG/1
200 TABLET, FILM COATED ORAL ONCE
Status: COMPLETED | OUTPATIENT
Start: 2021-08-13 | End: 2021-08-13

## 2021-08-13 RX ADMIN — CEFTRIAXONE SODIUM 2 G: 2 INJECTION, POWDER, FOR SOLUTION INTRAMUSCULAR; INTRAVENOUS at 15:05

## 2021-08-13 RX ADMIN — IBUPROFEN 600 MG: 600 TABLET, FILM COATED ORAL at 15:28

## 2021-08-13 RX ADMIN — PHENAZOPYRIDINE HYDROCHLORIDE 200 MG: 200 TABLET ORAL at 15:06

## 2021-08-13 ASSESSMENT — FIBROSIS 4 INDEX: FIB4 SCORE: 1.05

## 2021-08-13 NOTE — DISCHARGE INSTRUCTIONS
Pyelonephritis  (Urinary Tract Infection Involving the Kidney)    Start antibiotics tomorrow.  Take ibuprofen for fever and Zofran for nausea or vomiting.  Return for uncontrolled vomiting, ill appearance or dizziness with standing.  See your doctor or return if not better in 3 days.  Take over-the-counter Pyridium for burning for the next 24 to 36 hours until the antibiotic takes effect.    You had a borderline or high normal blood pressure reading today.  This does not necessarily mean you have hypertension.  Please followup with your/a primary physician for comprehensive blood pressure evaluation and yearly fasting cholesterol assessment.  BP Readings from Last 3 Encounters:   08/13/21 130/76   07/15/20 133/78   04/04/19 106/80         Pyelonephritis is an inflammation (soreness) of the kidney. It is generally caused by infection. It usually affects only one kidney. It may affect both. Usually these kidney infections have spread from the lower urinary tract. Because the urethra (the opening to the bladder) is much shorter in females than in males, urinary tract infections are much more common in females.    SYMPTOMS (what seems to be the problem)  Usually infections of the kidney have an abrupt onset of chills and fever. There is often an ache in the flank (the back near the lower ribs). There is often a generalized malaise. There may be nausea (feeling sick to your stomach) and vomiting. Some times there are symptoms (problems) of cystitis (bladder infection and inflammation). These symptoms often include urgency, increased frequency of urination, and burning with urination.    Pyelonephritis will usually respond to antibiotics (medications that kill bacteria). When this illness is recognized and treated promptly, complications are not common. Hospitalization may be required. If treated at home, take all the medicine given to you until finished. You may feel better in a few days, but TAKE ALL THE MEDICINE or the  infection may not respond. It can become more difficult to treat. Response can generally be expected in 7 to 10 days.    Drink lots of fluid, 3 to 4 quarts a day. Cranberry juice is especially recommended, in addition to large amounts of water. Avoid caffeine, tea and carbonated beverages (Coke®, 7-Up®, etc.), because they tend to irritate the bladder. Males should avoid alcohol as this may irritate the prostate. Aspirin, ibuprofen (Advil® or Motrin®) or acetaminophen (Tylenol®) may be used for temperature and/or discomfort. Only use these if your caregiver has not given medications that interfere.    TO PREVENT FURTHER INFECTIONS:  Empty the bladder often. Avoid holding urine for long periods of time.  After a bowel movement, women should cleanse front to back. Only use each tissue once.  Empty the bladder before and after sexual intercourse.    If you develop an increase of back pain, fever, nausea, vomiting, or your symptoms are no better in three (3) days, seek medical attention. Seek medical attention sooner if you are getting worse.    Document Released: 12/18/2006    [employer]® Patient Information ©2008 [employer].

## 2021-08-13 NOTE — ED PROVIDER NOTES
ED Provider Note    CHIEF COMPLAINT  Chief Complaint   Patient presents with   • Flank Pain     Reports bilat flank pain, reports recent antibx tx (unknown name) and feels that she is worse. Reports also tactile fevers and increase in dysuria.        HPI  Lizzy Kc is a 46 y.o. female who presents with bilateral flank pain.  On Monday she developed severe dysuria and was started on a 5-day course of antibiotics that occurred her urine orange.  She feels worse and still has dysuria, suprapubic pain and bilateral flank pain worse on the left.  No fever, nausea, vomiting, diabetes, immune compromise or pregnancy.  No hematuria or kidney stones.  No past surgical history the abdomen.  She is on birth control.    REVIEW OF SYSTEMS  Pertinent positives include: Flank pain, dysuria, abdominal pain.  Pertinent negatives include: Fatigue, diarrhea, rash, swelling.  10+ systems reviewed and negative.      PAST MEDICAL HISTORY  Denies    SOCIAL HISTORY  Social History     Tobacco Use   • Smoking status: Never Smoker   • Smokeless tobacco: Never Used   Substance Use Topics   • Alcohol use: Yes     Comment: occasional   • Drug use: No     Social History     Substance and Sexual Activity   Drug Use No       SURGICAL HISTORY  Past Surgical History:   Procedure Laterality Date   • PB LAP,APPENDECTOMY N/A 7/15/2020    Procedure: APPENDECTOMY, LAPAROSCOPIC;  Surgeon: Hima Valdez M.D.;  Location: SURGERY [facility];  Service: General       CURRENT MEDICATIONS  No current facility-administered medications for this encounter.     Current Outpatient Medications   Medication Sig Dispense Refill   • norgestrel-ethinyl estradiol (LO-OVRAL) 0.3-30 MG-MCG Tab Take 1 Tab by mouth every day.     • pantoprazole (PROTONIX) 20 MG tablet Take 20 mg by mouth 2 times a day.     • polyethylene glycol 3350 (MIRALAX) 17 GM/SCOOP Powder Take 17 g by mouth 1 time daily as needed (constipation). 500 g prn       ALLERGIES  Allergies  "  Allergen Reactions   • Iodine      Hives, difficulty breathing  RXN 13 years old       PHYSICAL EXAM  VITAL SIGNS: /76   Pulse 93   Temp 37.9 °C (100.2 °F) (Temporal)   Resp 16   Ht 1.727 m (5' 8\")   Wt 65.5 kg (144 lb 6.4 oz)   SpO2 96%   BMI 21.96 kg/m²   Reviewed and normal blood pressure, borderline febrile  Constitutional: Well developed, Well nourished, well-appearing.  HENT: Normocephalic, atraumatic, bilateral external ears normal, Wearing mask.   Eyes: PERRLA, conjunctiva pink, no scleral icterus.   Cardiovascular: Regular S1-S2 without murmur, rub, gallop.  No dependent edema or calf tenderness.  Respiratory: No rales, rhonchi, wheeze or cough.  Gastrointestinal: Soft, nontender, nondistended, no organomegaly.  Skin: No erythema, no rash.   Genitourinary:  No costovertebral angle tenderness.  Despite flank pain  Neurologic: Alert & oriented x 3, cranial nerves 2-12 intact by passive exam.  No focal deficit noted.  Psychiatric: Affect normal, Judgment normal, Mood normal.     DIFFERENTIAL DIAGNOSIS:  Pyelonephritis, renal colic, cystitis, sepsis.    RADIOLOGY/PROCEDURES  DX-CHEST-PORTABLE (1 VIEW)   Final Result      Negative single view of the chest.          LABORATORY:  Results for orders placed or performed during the hospital encounter of 08/13/21   CBC WITH DIFFERENTIAL   Result Value Ref Range    WBC 7.4 4.8 - 10.8 K/uL    RBC 4.34 4.20 - 5.40 M/uL    Hemoglobin 13.4 12.0 - 16.0 g/dL    Hematocrit 40.5 37.0 - 47.0 %    MCV 93.3 81.4 - 97.8 fL    MCH 30.9 27.0 - 33.0 pg    MCHC 33.1 (L) 33.6 - 35.0 g/dL    RDW 43.3 35.9 - 50.0 fL    Platelet Count 172 164 - 446 K/uL    MPV 10.7 9.0 - 12.9 fL    Neutrophils-Polys 68.60 44.00 - 72.00 %    Lymphocytes 18.10 (L) 22.00 - 41.00 %    Monocytes 9.70 0.00 - 13.40 %    Eosinophils 2.40 0.00 - 6.90 %    Basophils 0.70 0.00 - 1.80 %    Immature Granulocytes 0.50 0.00 - 0.90 %    Nucleated RBC 0.00 /100 WBC    Neutrophils (Absolute) 5.10 2.00 - 7.15 " K/uL    Lymphs (Absolute) 1.35 1.00 - 4.80 K/uL    Monos (Absolute) 0.72 0.00 - 0.85 K/uL    Eos (Absolute) 0.18 0.00 - 0.51 K/uL    Baso (Absolute) 0.05 0.00 - 0.12 K/uL    Immature Granulocytes (abs) 0.04 0.00 - 0.11 K/uL    NRBC (Absolute) 0.00 K/uL   COMP METABOLIC PANEL   Result Value Ref Range    Sodium 133 (L) 135 - 145 mmol/L    Potassium 3.9 3.6 - 5.5 mmol/L    Chloride 98 96 - 112 mmol/L    Co2 25 20 - 33 mmol/L    Anion Gap 10.0 7.0 - 16.0    Glucose 107 (H) 65 - 99 mg/dL    Bun 11 8 - 22 mg/dL    Creatinine 0.65 0.50 - 1.40 mg/dL    Calcium 9.4 8.4 - 10.2 mg/dL    AST(SGOT) 62 (H) 12 - 45 U/L    ALT(SGPT) 43 2 - 50 U/L    Alkaline Phosphatase 82 30 - 99 U/L    Total Bilirubin 0.3 0.1 - 1.5 mg/dL    Albumin 3.9 3.2 - 4.9 g/dL    Total Protein 7.7 6.0 - 8.2 g/dL    Globulin 3.8 (H) 1.9 - 3.5 g/dL    A-G Ratio 1.0 g/dL   URINALYSIS    Specimen: Urine   Result Value Ref Range    Color Orange (A)     Character Turbid (A)     Specific Gravity 1.025 <1.035    Ph 5.0 5.0 - 8.0    Glucose See Comment Negative mg/dL    Ketones See Comment Negative mg/dL    Protein See Comment Negative mg/dL    Bilirubin Negative Negative    Nitrite Positive (A) Negative    Leukocyte Esterase Moderate (A) Negative    Occult Blood Negative Negative    Micro Urine Req Microscopic    LACTIC ACID   Result Value Ref Range    Lactic Acid 1.0 0.5 - 2.0 mmol/L   URINE MICROSCOPIC (W/UA)   Result Value Ref Range    WBC Packed (A) /hpf    RBC 1-3 /hpf    Bacteria Moderate (A) None /hpf    Epithelial Cells Moderate (A) Few /hpf    Urine Crystals Mod Amorphous /hpf    Yeast Cells Rare (A) None /hpf       INTERVENTIONS:  Medications   cefTRIAXone (ROCEPHIN) 2 g in  mL IVPB (0 g Intravenous Stopped 8/13/21 1535)   phenazopyridine (PYRIDIUM) tablet 200 mg (200 mg Oral Given 8/13/21 1506)   ibuprofen (MOTRIN) tablet 600 mg (600 mg Oral Given 8/13/21 1528)       COURSE & MEDICAL DECISION MAKING  This patient presents with pyelonephritis  without evidence of sepsis.  Renal obstruction is unlikely.  There is no renal insufficiency or immune compromise.  She is a good candidate for an outpatient trial of therapy    PLAN:  New Prescriptions    CEFDINIR (OMNICEF) 300 MG CAP    Take 1 Capsule by mouth 2 times a day.    ONDANSETRON (ZOFRAN ODT) 4 MG TABLET DISPERSIBLE    Take 1 Tablet by mouth every 8 hours as needed for Nausea.   Urine culture pending  Pyridium  Pyelonephritis handout given  Return for control vomiting, dizziness, ill appearance, failure to improve in 2 to 3 days    Ivy Rosario, A.P.N.  [address]  [facility] [address]  [facility] [address]  [phone]    Schedule an appointment as soon as possible for a visit   As needed if not better Monday      CONDITION: Stable.    FINAL IMPRESSION  1. Pyelonephritis          Electronically signed by: Navi Constantino M.D., 8/13/2021 2:09 PM

## 2021-08-13 NOTE — ED NOTES
1315 Assessment done  1320 NSL initiated Bl Cx # 1 sent Orange urine specimen collected and sent   1330 2 nd Bl Cx sent POC reviewed Awaiting ERP evdevante  1350 ERP to eval

## 2021-08-14 NOTE — ED NOTES
1545 D/C instn reviewed To drink plenty of fluids Fill and complete ABX  May alternated tylenol and motrin for fever and flank pain Return for worsening s/s Specifically vomiting and dizziness  Otherwise F/U PMD for recheck urine

## 2021-08-15 LAB
BACTERIA UR CULT: NORMAL
SIGNIFICANT IND 70042: NORMAL
SITE SITE: NORMAL
SOURCE SOURCE: NORMAL

## 2021-08-18 LAB
BACTERIA BLD CULT: NORMAL
BACTERIA BLD CULT: NORMAL
SIGNIFICANT IND 70042: NORMAL
SIGNIFICANT IND 70042: NORMAL
SITE SITE: NORMAL
SITE SITE: NORMAL
SOURCE SOURCE: NORMAL
SOURCE SOURCE: NORMAL

## 2021-10-15 ENCOUNTER — HOSPITAL ENCOUNTER (OUTPATIENT)
Dept: RADIOLOGY | Facility: MEDICAL CENTER | Age: 46
End: 2021-10-15
Attending: NURSE PRACTITIONER
Payer: COMMERCIAL

## 2021-10-15 DIAGNOSIS — Z12.31 VISIT FOR SCREENING MAMMOGRAM: ICD-10-CM

## 2021-10-15 PROCEDURE — 77063 BREAST TOMOSYNTHESIS BI: CPT

## 2022-02-04 ENCOUNTER — HOSPITAL ENCOUNTER (OUTPATIENT)
Dept: LAB | Facility: MEDICAL CENTER | Age: 47
End: 2022-02-04
Attending: PHYSICIAN ASSISTANT
Payer: COMMERCIAL

## 2022-02-04 PROCEDURE — 88175 CYTOPATH C/V AUTO FLUID REDO: CPT

## 2022-02-04 PROCEDURE — 87624 HPV HI-RISK TYP POOLED RSLT: CPT

## 2022-02-07 LAB
CYTOLOGY REG CYTOL: ABNORMAL
HPV HR 12 DNA CVX QL NAA+PROBE: POSITIVE
HPV16 DNA SPEC QL NAA+PROBE: NEGATIVE
HPV18 DNA SPEC QL NAA+PROBE: NEGATIVE
SPECIMEN SOURCE: ABNORMAL

## 2022-03-25 ENCOUNTER — RESEARCH ENCOUNTER (OUTPATIENT)
Dept: MEDICAL GROUP | Facility: PHYSICIAN GROUP | Age: 47
End: 2022-03-25
Payer: COMMERCIAL

## 2022-03-25 DIAGNOSIS — Z00.6 RESEARCH STUDY PATIENT: ICD-10-CM

## 2022-04-07 ENCOUNTER — HOSPITAL ENCOUNTER (OUTPATIENT)
Dept: LAB | Facility: MEDICAL CENTER | Age: 47
End: 2022-04-07
Attending: PHYSICIAN ASSISTANT
Payer: COMMERCIAL

## 2022-04-07 LAB — PATHOLOGY CONSULT NOTE: NORMAL

## 2022-04-07 PROCEDURE — 87591 N.GONORRHOEAE DNA AMP PROB: CPT

## 2022-04-07 PROCEDURE — 87510 GARDNER VAG DNA DIR PROBE: CPT

## 2022-04-07 PROCEDURE — 88305 TISSUE EXAM BY PATHOLOGIST: CPT | Mod: 59

## 2022-04-07 PROCEDURE — 87491 CHLMYD TRACH DNA AMP PROBE: CPT

## 2022-04-07 PROCEDURE — 87660 TRICHOMONAS VAGIN DIR PROBE: CPT

## 2022-04-07 PROCEDURE — 87480 CANDIDA DNA DIR PROBE: CPT

## 2022-04-08 LAB
C TRACH DNA GENITAL QL NAA+PROBE: NEGATIVE
CANDIDA DNA VAG QL PROBE+SIG AMP: NEGATIVE
G VAGINALIS DNA VAG QL PROBE+SIG AMP: POSITIVE
N GONORRHOEA DNA GENITAL QL NAA+PROBE: NEGATIVE
SPECIMEN SOURCE: NORMAL
T VAGINALIS DNA VAG QL PROBE+SIG AMP: NEGATIVE

## 2022-04-29 LAB
APOB+LDLR+PCSK9 GENE MUT ANL BLD/T: NOT DETECTED
BRCA1+BRCA2 DEL+DUP + FULL MUT ANL BLD/T: NOT DETECTED
MLH1+MSH2+MSH6+PMS2 GN DEL+DUP+FUL M: NOT DETECTED

## 2022-05-09 DIAGNOSIS — Z00.6 RESEARCH STUDY PATIENT: ICD-10-CM

## 2022-05-18 ENCOUNTER — APPOINTMENT (OUTPATIENT)
Dept: LAB | Facility: MEDICAL CENTER | Age: 47
End: 2022-05-18
Attending: PATHOLOGY
Payer: COMMERCIAL

## 2022-06-30 ENCOUNTER — HOSPITAL ENCOUNTER (EMERGENCY)
Facility: MEDICAL CENTER | Age: 47
End: 2022-06-30
Attending: EMERGENCY MEDICINE
Payer: COMMERCIAL

## 2022-06-30 VITALS
OXYGEN SATURATION: 99 % | SYSTOLIC BLOOD PRESSURE: 137 MMHG | HEART RATE: 63 BPM | WEIGHT: 127.65 LBS | DIASTOLIC BLOOD PRESSURE: 87 MMHG | BODY MASS INDEX: 19.35 KG/M2 | TEMPERATURE: 98.5 F | HEIGHT: 68 IN | RESPIRATION RATE: 12 BRPM

## 2022-06-30 DIAGNOSIS — H53.9 VISION CHANGES: ICD-10-CM

## 2022-06-30 LAB
APPEARANCE UR: CLEAR
BILIRUB UR QL STRIP.AUTO: NEGATIVE
COLOR UR: YELLOW
GLUCOSE UR STRIP.AUTO-MCNC: NEGATIVE MG/DL
KETONES UR STRIP.AUTO-MCNC: NEGATIVE MG/DL
LEUKOCYTE ESTERASE UR QL STRIP.AUTO: NEGATIVE
MICRO URNS: NORMAL
NITRITE UR QL STRIP.AUTO: NEGATIVE
PH UR STRIP.AUTO: 6 [PH] (ref 5–8)
PROT UR QL STRIP: NEGATIVE MG/DL
RBC UR QL AUTO: NEGATIVE
SP GR UR STRIP.AUTO: <=1.005

## 2022-06-30 PROCEDURE — 81003 URINALYSIS AUTO W/O SCOPE: CPT

## 2022-06-30 PROCEDURE — 99283 EMERGENCY DEPT VISIT LOW MDM: CPT

## 2022-06-30 ASSESSMENT — FIBROSIS 4 INDEX: FIB4 SCORE: 2.53

## 2022-06-30 NOTE — ED NOTES
Med Rec Complete per patient  Allergies Reviewed with patient  No antibiotics within the last 30 days  Patient's Preferred Pharmacy: [facility]

## 2022-06-30 NOTE — ED TRIAGE NOTES
Lizzy Kc  47 y.o.  Chief Complaint   Patient presents with   • Flank Pain   • Blurred Vision     Pt ambulatory to triage with c/o L flank pain since she woke up today and blurry vision since 0800.  Pt rates her pain 3/10.

## 2022-06-30 NOTE — ED NOTES
Visual acuity test administered with results with corrected vision: right eye 20/25, left eye 20/25, both eyes 20/25. With uncorrected vision right eye 20/200, left eye 20/70, both eyes 20/50.

## 2022-06-30 NOTE — ED PROVIDER NOTES
ED Provider Note    CHIEF COMPLAINT  Chief Complaint   Patient presents with   • Flank Pain   • Blurred Vision       HPI  Lizzy Kc is a 47 y.o. female who presents with blurred vision.  The patient states she was at work today when she started having binocular blurred vision.  She states the left eye started to improve but the right eye still has significant blurred vision.  She does wear corrective lenses.  She does not have any associated pain.  She has not had any recent fevers.  She is unaware of any sick contacts.  She did have some left flank pain that is persisted.  She is unaware of any exacerbating or relieving factors with the flank pain.  She does not have any urinary symptoms.    REVIEW OF SYSTEMS  See HPI for further details. All other systems are negative.     PAST MEDICAL HISTORY  No past medical history on file.    FAMILY HISTORY  [unfilled]    SOCIAL HISTORY  Social History     Socioeconomic History   • Marital status:    Tobacco Use   • Smoking status: Never Smoker   • Smokeless tobacco: Never Used   Vaping Use   • Vaping Use: Never used   Substance and Sexual Activity   • Alcohol use: Yes     Comment: occasional   • Drug use: No       SURGICAL HISTORY  Past Surgical History:   Procedure Laterality Date   • LA LAP,APPENDECTOMY N/A 7/15/2020    Procedure: APPENDECTOMY, LAPAROSCOPIC;  Surgeon: Hima Valdez M.D.;  Location: SURGERY [facility];  Service: General   • LA BREAST AUGMENTATION WITH IMPLANT         CURRENT MEDICATIONS  Home Medications     Reviewed by Kristel Pendleton (Pharmacy Tech) on 06/30/22 at 1031  Med List Status: Complete   Medication Last Dose Status   Glycerin-Hypromellose- (VISINE DRY EYE OP) 6/30/2022 Active   MILK THISTLE PO 6/30/2022 Active                ALLERGIES  Allergies   Allergen Reactions   • Iodine Hives     Hives, difficulty breathing  RXN 13 years old       PHYSICAL EXAM  VITAL SIGNS: BP (!) 152/82   Pulse 81   Temp 36.9 °C (98.5 °F)  "(Temporal)   Resp 16   Ht 1.727 m (5' 8\")   Wt 57.9 kg (127 lb 10.3 oz)   LMP 06/12/2022   SpO2 100%   BMI 19.41 kg/m²       Constitutional: Well developed, Well nourished, No acute distress, Non-toxic appearance.   HENT: Normocephalic, Atraumatic, Bilateral external ears normal, Oropharynx moist, No oral exudates, Nose normal.   Eyes: PERRLA, EOMI, Conjunctiva normal, No discharge.   Neck: Normal range of motion, No tenderness, Supple, No stridor.   Lymphatic: No lymphadenopathy noted.   Cardiovascular: Normal heart rate, Normal rhythm, No murmurs, No rubs, No gallops.   Thorax & Lungs: Normal breath sounds, No respiratory distress, No wheezing, No chest tenderness.   Abdomen: Bowel sounds normal, Soft, No tenderness, No masses, No pulsatile masses.   Skin: Warm, Dry, No erythema, No rash.   Back: No tenderness, No CVA tenderness.   Extremities: Intact distal pulses, No edema, No tenderness, No cyanosis, No clubbing.   Neurologic: Alert & oriented x 3, Normal motor function, Normal sensory function, No focal deficits noted.   Psychiatric: Affect normal, Judgment normal, Mood normal.       Results for orders placed or performed during the hospital encounter of 06/30/22   URINALYSIS    Specimen: Urine, Clean Catch   Result Value Ref Range    Color Yellow     Character Clear     Specific Gravity <=1.005 <1.035    Ph 6.0 5.0 - 8.0    Glucose Negative Negative mg/dL    Ketones Negative Negative mg/dL    Protein Negative Negative mg/dL    Bilirubin Negative Negative    Nitrite Negative Negative    Leukocyte Esterase Negative Negative    Occult Blood Negative Negative    Micro Urine Req see below        COURSE & MEDICAL DECISION MAKING  Pertinent Labs & Imaging studies reviewed. (See chart for details)  This a 47-year-old-year-old female who comes in to the emergency department with flank pain and blurred vision.  As for the blurred vision I do not have a clear source.  This is binocular and she is neurologically " intact.  Therefore an intracranial source would be very unlikely.  The patient's corrected visual acuity is normal.  She does not have any visual field cuts on my exam.  She does not have any eye pain.  I do not have a clear source.  The patient also presents with left flank pain.  Her urinalysis does not show any evidence of a urinary source.  She is been observed in the emergency department and her vision has completely resolved.  She still has some mild flank pain.  This most likely is muscular.  Her abdomen is benign.  She will be discharged home with instructions to take anti-inflammatories as needed for the flank pain and return for any change in vision or increased pain.    FINAL IMPRESSION  1.  Blurred vision  2.  Left flank pain    Disposition  The patient will be discharged in stable condition         Electronically signed by: Ish Perez M.D., 6/30/2022 11:28 AM

## 2022-06-30 NOTE — DISCHARGE INSTRUCTIONS
Take Tylenol or Motrin as needed for the flank pain.  Follow-up with your eye doctor and return if you are acutely worse.

## 2022-10-25 ENCOUNTER — HOSPITAL ENCOUNTER (OUTPATIENT)
Dept: RADIOLOGY | Facility: MEDICAL CENTER | Age: 47
End: 2022-10-25
Attending: NURSE PRACTITIONER
Payer: COMMERCIAL

## 2022-10-25 DIAGNOSIS — Z12.31 VISIT FOR SCREENING MAMMOGRAM: ICD-10-CM

## 2022-10-25 PROCEDURE — 77063 BREAST TOMOSYNTHESIS BI: CPT

## 2023-03-15 ENCOUNTER — HOSPITAL ENCOUNTER (OUTPATIENT)
Facility: MEDICAL CENTER | Age: 48
End: 2023-03-15
Attending: PHYSICIAN ASSISTANT
Payer: COMMERCIAL

## 2023-03-15 ENCOUNTER — HOSPITAL ENCOUNTER (OUTPATIENT)
Dept: LAB | Facility: MEDICAL CENTER | Age: 48
End: 2023-03-15
Attending: PHYSICIAN ASSISTANT

## 2023-03-15 PROCEDURE — 88175 CYTOPATH C/V AUTO FLUID REDO: CPT

## 2023-03-15 PROCEDURE — 87624 HPV HI-RISK TYP POOLED RSLT: CPT

## 2023-07-08 ENCOUNTER — HOSPITAL ENCOUNTER (OUTPATIENT)
Dept: LAB | Facility: MEDICAL CENTER | Age: 48
End: 2023-07-08
Attending: NURSE PRACTITIONER
Payer: COMMERCIAL

## 2023-07-08 LAB
25(OH)D3 SERPL-MCNC: 31 NG/ML (ref 30–100)
ALBUMIN SERPL BCP-MCNC: 4.6 G/DL (ref 3.2–4.9)
ALBUMIN/GLOB SERPL: 1.8 G/DL
ALP SERPL-CCNC: 44 U/L (ref 30–99)
ALT SERPL-CCNC: 9 U/L (ref 2–50)
ANION GAP SERPL CALC-SCNC: 14 MMOL/L (ref 7–16)
AST SERPL-CCNC: 11 U/L (ref 12–45)
BASOPHILS # BLD AUTO: 1.6 % (ref 0–1.8)
BASOPHILS # BLD: 0.12 K/UL (ref 0–0.12)
BILIRUB SERPL-MCNC: 0.3 MG/DL (ref 0.1–1.5)
BUN SERPL-MCNC: 17 MG/DL (ref 8–22)
CALCIUM ALBUM COR SERPL-MCNC: 8.9 MG/DL (ref 8.5–10.5)
CALCIUM SERPL-MCNC: 9.4 MG/DL (ref 8.5–10.5)
CHLORIDE SERPL-SCNC: 101 MMOL/L (ref 96–112)
CHOLEST SERPL-MCNC: 205 MG/DL (ref 100–199)
CO2 SERPL-SCNC: 24 MMOL/L (ref 20–33)
CREAT SERPL-MCNC: 0.64 MG/DL (ref 0.5–1.4)
EOSINOPHIL # BLD AUTO: 0.19 K/UL (ref 0–0.51)
EOSINOPHIL NFR BLD: 2.5 % (ref 0–6.9)
ERYTHROCYTE [DISTWIDTH] IN BLOOD BY AUTOMATED COUNT: 43.8 FL (ref 35.9–50)
FASTING STATUS PATIENT QL REPORTED: NORMAL
GFR SERPLBLD CREATININE-BSD FMLA CKD-EPI: 109 ML/MIN/1.73 M 2
GLOBULIN SER CALC-MCNC: 2.5 G/DL (ref 1.9–3.5)
GLUCOSE SERPL-MCNC: 87 MG/DL (ref 65–99)
HCT VFR BLD AUTO: 40 % (ref 37–47)
HDLC SERPL-MCNC: 97 MG/DL
HGB BLD-MCNC: 13.1 G/DL (ref 12–16)
IMM GRANULOCYTES # BLD AUTO: 0.02 K/UL (ref 0–0.11)
IMM GRANULOCYTES NFR BLD AUTO: 0.3 % (ref 0–0.9)
LDLC SERPL CALC-MCNC: 91 MG/DL
LYMPHOCYTES # BLD AUTO: 2.46 K/UL (ref 1–4.8)
LYMPHOCYTES NFR BLD: 32.9 % (ref 22–41)
MCH RBC QN AUTO: 31 PG (ref 27–33)
MCHC RBC AUTO-ENTMCNC: 32.8 G/DL (ref 32.2–35.5)
MCV RBC AUTO: 94.6 FL (ref 81.4–97.8)
MONOCYTES # BLD AUTO: 0.41 K/UL (ref 0–0.85)
MONOCYTES NFR BLD AUTO: 5.5 % (ref 0–13.4)
NEUTROPHILS # BLD AUTO: 4.27 K/UL (ref 1.82–7.42)
NEUTROPHILS NFR BLD: 57.2 % (ref 44–72)
NRBC # BLD AUTO: 0 K/UL
NRBC BLD-RTO: 0 /100 WBC (ref 0–0.2)
PLATELET # BLD AUTO: 168 K/UL (ref 164–446)
PMV BLD AUTO: 11.8 FL (ref 9–12.9)
POTASSIUM SERPL-SCNC: 4 MMOL/L (ref 3.6–5.5)
PROT SERPL-MCNC: 7.1 G/DL (ref 6–8.2)
RBC # BLD AUTO: 4.23 M/UL (ref 4.2–5.4)
SODIUM SERPL-SCNC: 139 MMOL/L (ref 135–145)
T3 SERPL-MCNC: 120 NG/DL (ref 60–181)
T4 SERPL-MCNC: 6.5 UG/DL (ref 4–12)
TRIGL SERPL-MCNC: 85 MG/DL (ref 0–149)
TSH SERPL DL<=0.005 MIU/L-ACNC: 2.19 UIU/ML (ref 0.38–5.33)
VIT B12 SERPL-MCNC: 455 PG/ML (ref 211–911)
WBC # BLD AUTO: 7.5 K/UL (ref 4.8–10.8)

## 2023-07-08 PROCEDURE — 84436 ASSAY OF TOTAL THYROXINE: CPT

## 2023-07-08 PROCEDURE — 85025 COMPLETE CBC W/AUTO DIFF WBC: CPT

## 2023-07-08 PROCEDURE — 84443 ASSAY THYROID STIM HORMONE: CPT

## 2023-07-08 PROCEDURE — 80053 COMPREHEN METABOLIC PANEL: CPT

## 2023-07-08 PROCEDURE — 36415 COLL VENOUS BLD VENIPUNCTURE: CPT

## 2023-07-08 PROCEDURE — 80061 LIPID PANEL: CPT

## 2023-07-08 PROCEDURE — 82607 VITAMIN B-12: CPT

## 2023-07-08 PROCEDURE — 82306 VITAMIN D 25 HYDROXY: CPT

## 2023-07-08 PROCEDURE — 84480 ASSAY TRIIODOTHYRONINE (T3): CPT

## 2023-11-09 ENCOUNTER — APPOINTMENT (OUTPATIENT)
Dept: RADIOLOGY | Facility: MEDICAL CENTER | Age: 48
End: 2023-11-09
Attending: NURSE PRACTITIONER
Payer: COMMERCIAL

## 2023-11-09 DIAGNOSIS — Z12.31 VISIT FOR SCREENING MAMMOGRAM: ICD-10-CM

## 2023-11-09 PROCEDURE — 77063 BREAST TOMOSYNTHESIS BI: CPT

## 2024-06-19 ENCOUNTER — HOSPITAL ENCOUNTER (OUTPATIENT)
Facility: MEDICAL CENTER | Age: 49
End: 2024-06-19
Attending: PHYSICIAN ASSISTANT
Payer: COMMERCIAL

## 2024-06-19 PROCEDURE — 88175 CYTOPATH C/V AUTO FLUID REDO: CPT

## 2024-06-22 LAB
COMMENT NL11729A: NORMAL
CYTOLOGIST CVX/VAG CYTO: NORMAL
CYTOLOGY CVX/VAG DOC CYTO: NORMAL
CYTOLOGY CVX/VAG DOC THIN PREP: NORMAL
NOTE NL11727A: NORMAL
OTHER STN SPEC: NORMAL
STAT OF ADQ CVX/VAG CYTO-IMP: NORMAL

## 2025-01-24 ENCOUNTER — APPOINTMENT (OUTPATIENT)
Dept: RADIOLOGY | Facility: MEDICAL CENTER | Age: 50
End: 2025-01-24
Attending: NURSE PRACTITIONER
Payer: COMMERCIAL

## 2025-01-24 DIAGNOSIS — Z12.31 VISIT FOR SCREENING MAMMOGRAM: ICD-10-CM

## 2025-01-24 PROCEDURE — 77067 SCR MAMMO BI INCL CAD: CPT

## 2025-06-24 ENCOUNTER — HOSPITAL ENCOUNTER (OUTPATIENT)
Facility: MEDICAL CENTER | Age: 50
End: 2025-06-24
Attending: PHYSICIAN ASSISTANT
Payer: COMMERCIAL

## 2025-06-24 PROCEDURE — 88142 CYTOPATH C/V THIN LAYER: CPT

## 2025-06-28 LAB — THINPREP PAP, CYTOLOGY NL11781: NORMAL

## (undated) DEVICE — GLOVE BIOGEL SZ 8 SURGICAL PF LTX - (50PR/BX 4BX/CA)

## (undated) DEVICE — SEALER ARTICULATING TISSUE NSEAL (6/BX)

## (undated) DEVICE — ELECTRODE DUAL RETURN W/ CORD - (50/PK)

## (undated) DEVICE — GLOVE BIOGEL PI INDICATOR SZ 7.0 SURGICAL PF LF - (50/BX 4BX/CA)

## (undated) DEVICE — BAG RETRIEVAL 10ML (10EA/BX)

## (undated) DEVICE — SUTURE 4-0 MONOCRYL PLUS PS-2 - 27 INCH (36/BX)

## (undated) DEVICE — TROCAR 5X100 NON BLADED Z-TH - READ KII (6/BX)

## (undated) DEVICE — GOWN WARMING STANDARD FLEX - (30/CA)

## (undated) DEVICE — SET SUCTION/IRRIGATION WITH DISPOSABLE TIP (6/CA )PART #0250-070-520 IS A SUB

## (undated) DEVICE — KIT SURGIFLO W/OUT THROMBIN - (6EA/CA)

## (undated) DEVICE — TROCAR Z THREAD12MM OPTICAL - NON BLADED (6/BX)

## (undated) DEVICE — GLOVE BIOGEL SZ 7 SURGICAL PF LTX - (50PR/BX 4BX/CA)

## (undated) DEVICE — GLOVE, LITE (PAIR)

## (undated) DEVICE — ADHESIVE DERMABOND HVD MINI (12EA/BX)

## (undated) DEVICE — SODIUM CHL IRRIGATION 0.9% 1000ML (12EA/CA)

## (undated) DEVICE — SUTURE 0 PDS CT-2 (36PK/BX)

## (undated) DEVICE — WATER IRRIGATION STERILE 1000ML (12EA/CA)

## (undated) DEVICE — KIT ANESTHESIA W/CIRCUIT & 3/LT BAG W/FILTER (20EA/CA)

## (undated) DEVICE — TUBE E-T HI-LO CUFF 7.0MM (10EA/PK)

## (undated) DEVICE — CANISTER SUCTION RIGID RED 1500CC (40EA/CA)

## (undated) DEVICE — APPLICATOR SURGIFLO - (6EA/CA)

## (undated) DEVICE — HUMID-VENT HEAT AND MOISTURE EXCHANGE- (50/BX)

## (undated) DEVICE — TUBE CONNECTING SUCTION - CLEAR PLASTIC STERILE 72 IN (50EA/CA)

## (undated) DEVICE — LACTATED RINGERS INJ 1000 ML - (14EA/CA 60CA/PF)

## (undated) DEVICE — CHLORAPREP 26 ML APPLICATOR - ORANGE TINT(25/CA)

## (undated) DEVICE — STAPLER 45MM ARTICULATING - ENDO (3EA/BX)

## (undated) DEVICE — ELECTRODE 850 FOAM ADHESIVE - HYDROGEL RADIOTRNSPRNT (50/PK)

## (undated) DEVICE — GLOVE BIOGEL INDICATOR SZ 7SURGICAL PF LTX - (50/BX 4BX/CA)

## (undated) DEVICE — SUTURE GENERAL

## (undated) DEVICE — TUBING INSUFFLATION - (10/BX)

## (undated) DEVICE — ELECTRODE 5MM LHK LAPSCP STERILE DISP- MEGADYNE  (5/CA)

## (undated) DEVICE — HEAD HOLDER JUNIOR/ADULT

## (undated) DEVICE — GLOVE BIOGEL PI ULTRATOUCH SZ 7.0 SURGICAL PF LF- POWDER FREE (50/BX 4BX/CA)

## (undated) DEVICE — BAG SPONGE COUNT 10.25 X 32 - BLUE (250/CA)

## (undated) DEVICE — NEPTUNE 4 PORT MANIFOLD - (20/PK)

## (undated) DEVICE — STAPLE 45MM VASCULAR WHITE 2.5MM (12EA/BX)

## (undated) DEVICE — CLIP MED LG INTNL HRZN TI ESCP - (20/BX)

## (undated) DEVICE — Device

## (undated) DEVICE — SENSOR SPO2 NEO LNCS ADHESIVE (20/BX) SEE USER NOTES

## (undated) DEVICE — PROTECTOR ULNA NERVE - (36PR/CA)

## (undated) DEVICE — MASK ANESTHESIA ADULT  - (100/CA)

## (undated) DEVICE — SUCTION INSTRUMENT YANKAUER BULBOUS TIP W/O VENT (50EA/CA)

## (undated) DEVICE — CANNULA W/SEAL 5X100 Z-THRE - ADED KII (12/BX)

## (undated) DEVICE — SYRINGE SAFETY 10 ML 18 GA X 1 1/2 BLUNT LL (100/BX 4BX/CA)